# Patient Record
Sex: FEMALE | Race: WHITE | HISPANIC OR LATINO | Employment: FULL TIME | URBAN - NONMETROPOLITAN AREA
[De-identification: names, ages, dates, MRNs, and addresses within clinical notes are randomized per-mention and may not be internally consistent; named-entity substitution may affect disease eponyms.]

---

## 2017-01-05 ENCOUNTER — APPOINTMENT (OUTPATIENT)
Dept: OCCUPATIONAL MEDICINE | Facility: OTHER | Age: 32
End: 2017-01-05

## 2017-01-13 ENCOUNTER — APPOINTMENT (OUTPATIENT)
Dept: OCCUPATIONAL MEDICINE | Facility: OTHER | Age: 32
End: 2017-01-13

## 2019-02-21 ENCOUNTER — HOSPITAL ENCOUNTER (EMERGENCY)
Facility: HOSPITAL | Age: 34
Discharge: LEFT BEFORE DISCHARGE | End: 2019-02-21
Admitting: FAMILY MEDICINE
Payer: COMMERCIAL

## 2019-02-21 VITALS
TEMPERATURE: 98.9 F | SYSTOLIC BLOOD PRESSURE: 144 MMHG | RESPIRATION RATE: 14 BRPM | OXYGEN SATURATION: 99 % | DIASTOLIC BLOOD PRESSURE: 91 MMHG

## 2019-02-21 PROCEDURE — 40000268 ZZH STATISTIC NO CHARGES

## 2019-02-21 NOTE — ED AVS SNAPSHOT
[facility]  [address]  [place] [address]  Phone:  [phone]                                    Opal Henderson   MRN: [id number]    Department:  [facility]   Date of Visit:  2/21/2019           After Visit Summary Signature Page    I have received my discharge instructions, and my questions have been answered. I have discussed any challenges I see with this plan with the nurse or doctor.    ..........................................................................................................................................  Patient/Patient Representative Signature      ..........................................................................................................................................  Patient Representative Print Name and Relationship to Patient    ..................................................               ................................................  Date                                   Time    ..........................................................................................................................................  Reviewed by Signature/Title    ...................................................              ..............................................  Date                                               Time          22EPIC Rev 08/18

## 2019-02-22 ENCOUNTER — HOSPITAL ENCOUNTER (EMERGENCY)
Facility: HOSPITAL | Age: 34
Discharge: HOME | End: 2019-02-22
Attending: NURSE PRACTITIONER | Admitting: NURSE PRACTITIONER
Payer: COMMERCIAL

## 2019-02-22 VITALS
TEMPERATURE: 98.1 F | HEIGHT: 59 IN | RESPIRATION RATE: 18 BRPM | SYSTOLIC BLOOD PRESSURE: 146 MMHG | DIASTOLIC BLOOD PRESSURE: 102 MMHG | BODY MASS INDEX: 32.05 KG/M2 | WEIGHT: 159 LBS | HEART RATE: 65 BPM | OXYGEN SATURATION: 99 %

## 2019-02-22 DIAGNOSIS — H66.92 LEFT OTITIS MEDIA, UNSPECIFIED OTITIS MEDIA TYPE: ICD-10-CM

## 2019-02-22 PROCEDURE — 99203 OFFICE O/P NEW LOW 30 MIN: CPT | Mod: Z6 | Performed by: NURSE PRACTITIONER

## 2019-02-22 PROCEDURE — G0463 HOSPITAL OUTPT CLINIC VISIT: HCPCS

## 2019-02-22 RX ORDER — CETIRIZINE HYDROCHLORIDE 10 MG/1
10 TABLET ORAL AT BEDTIME
Qty: 30 TABLET | Refills: 0 | Status: SHIPPED | OUTPATIENT
Start: 2019-02-22 | End: 2019-12-28

## 2019-02-22 RX ORDER — AMOXICILLIN 500 MG/1
1000 CAPSULE ORAL 2 TIMES DAILY
Qty: 40 CAPSULE | Refills: 0 | Status: SHIPPED | OUTPATIENT
Start: 2019-02-22 | End: 2019-03-04

## 2019-02-22 RX ORDER — FLUTICASONE PROPIONATE 50 MCG
2 SPRAY, SUSPENSION (ML) NASAL DAILY
Qty: 1 BOTTLE | Refills: 0 | Status: SHIPPED | OUTPATIENT
Start: 2019-02-22 | End: 2019-12-28

## 2019-02-22 ASSESSMENT — ENCOUNTER SYMPTOMS
COUGH: 1
APPETITE CHANGE: 0
SORE THROAT: 1
FEVER: 0

## 2019-02-22 ASSESSMENT — MIFFLIN-ST. JEOR: SCORE: 1336.85

## 2019-02-22 NOTE — ED AVS SNAPSHOT
[facility]  [address]  [place] [address]  Phone:  [phone]                                    Opal Henderson   MRN: [id number]    Department:  [facility]   Date of Visit:  2/22/2019           After Visit Summary Signature Page    I have received my discharge instructions, and my questions have been answered. I have discussed any challenges I see with this plan with the nurse or doctor.    ..........................................................................................................................................  Patient/Patient Representative Signature      ..........................................................................................................................................  Patient Representative Print Name and Relationship to Patient    ..................................................               ................................................  Date                                   Time    ..........................................................................................................................................  Reviewed by Signature/Title    ...................................................              ..............................................  Date                                               Time          22EPIC Rev 08/18

## 2019-02-22 NOTE — ED PROVIDER NOTES
History     Chief Complaint   Patient presents with     Otalgia     Bilateral X 2-3 days     HPI  Opal Henderson is a 33 year old female who presents today alone with a CC of bilateral ear pain and pressure x 2 days.  No fevers.  Associated symptoms include:  Cough, nasal congestion, mild sore throat, scratchy throat.  She has a history of OM as an adult.  She had PE tubes as a child.  No other otological surgery.  No emile otorrhea.  She has a history of spontaneous perforation from OM in the past.      Allergies:  No Known Allergies    Problem List:    Patient Active Problem List    Diagnosis Date Noted     Surgery, elective 2016     Priority: Medium      (spontaneous vaginal delivery) 2016     Priority: Medium     VTX/VTX Twins   delivery Ambrose 16  Intact  Boy [place]  Girl Flor  Desires BTO       Oligohydramnios 2016     Priority: Medium     GBS (group B Streptococcus carrier), +RV culture, currently pregnant 2016     Priority: Medium     ACP (advance care planning) 2016     Priority: Medium     Advance Care Planning 2016: ACP Review of Chart / Resources Provided:  Reviewed chart for advance care plan.  Opal Henderson has been provided information and resources to begin or update their advance care plan.  Added by Kadie Mcdaniel             Abnormal maternal glucose tolerance, antepartum 2016     Priority: Medium     GDM-Diabetes center referral       Encounter for sterilization 05/10/2016     Priority: Medium     Desires pp BTO.  Counseling and federal sterilization consent forms done.         Dichorionic diamniotic twin gestation 2016     Priority: Medium     Di-Di on early US  Suprise  Serial US growth  Nml Quad  Transfer of care from [facility] Dr. Barrios  Nml level 2 US  BC-Desires BTO  Peds:  Dr. Barrios  RHIG and TDAP done 28 weeks          Past Medical History:    Past Medical History:   Diagnosis Date     HGSIL on Pap smear of cervix  "05/10/2005     Pap smear of cervix with ASCUS, cannot exclude HGSIL 10/16/2012     Reflux esophagitis 4/7/2009       Past Surgical History:    Past Surgical History:   Procedure Laterality Date     HERNIORRHAPHY UMBILICAL N/A 8/19/2016    Procedure: HERNIORRHAPHY UMBILICAL;  Surgeon: Mirza Baker MD;  [facility]     LAPAROTOMY MINI, TUBAL LIGATION (POST PARTUM), COMBINED Bilateral 8/19/2016    Procedure: COMBINED LAPAROTOMY MINI, TUBAL LIGATION (POST PARTUM);  Surgeon: Mirza Baker MD;  Location: [facility]     PE TUBES  pt 10 years old       Family History:    Family History   Problem Relation Age of Onset     Asthma Sister      Asthma Brother      Heart Disease Maternal Grandmother      Hearing Loss Paternal Grandfather      Heart Disease Paternal Grandfather        Social History:  Marital Status:  Single [1]  Social History     Tobacco Use     Smoking status: Never Smoker     Smokeless tobacco: Never Used   Substance Use Topics     Alcohol use: No     Alcohol/week: 0.0 oz     Drug use: No        Medications:      amoxicillin (AMOXIL) 500 MG capsule   cetirizine (ZYRTEC) 10 MG tablet   fluticasone (FLONASE) 50 MCG/ACT nasal spray         Review of Systems   Constitutional: Negative for appetite change and fever.   HENT: Positive for congestion, hearing loss (decreased over last couple of days left > right) and sore throat.    Respiratory: Positive for cough.        Physical Exam   BP: (!) 146/102  Pulse: 65  Temp: 98.1  F (36.7  C)  Resp: 18  Height: 149.9 cm (4' 11\")  Weight: 72.1 kg (159 lb)  SpO2: 99 %      Physical Exam   Constitutional: She appears well-developed. She is cooperative. She does not appear ill.   HENT:   Head: Normocephalic and atraumatic.   Left Ear: Tympanic membrane is scarred, erythematous and retracted. Tympanic membrane is not perforated.   Mouth/Throat: Uvula is midline and oropharynx is clear and moist.   Right Cerumen occluded, unable to view TM   Cardiovascular: Normal rate and " regular rhythm.   Pulmonary/Chest: Effort normal and breath sounds normal.   Musculoskeletal: Normal range of motion.   Neurological: She is alert.   Skin: Skin is warm and dry.   Psychiatric: She has a normal mood and affect. Her behavior is normal.   Nursing note and vitals reviewed.      ED Course        Procedures    Assessments & Plan (with Medical Decision Making)     I have reviewed the nursing notes.    I have reviewed the findings, diagnosis, plan and need for follow up with the patient.  ASSESSMENT / PLAN:  (H66.92) Left otitis media, unspecified otitis media type  Comment: with right otalgia, unable to view right TM due to cerumen occlusion  Plan:  Amoxicillin as prescribed   Zyrtec as prescribed   Flonase as prescribed   Return to ED/UC as needed if symptoms worsen or new concerns develop    Follow up with PCP if symptoms do not improve in 3-4 days of treatment   Patient verbally educated and given appropriate education sheets for each of their diagnoses and has no questions.   Take OTC motrin or tylenol as directed on the bottle as needed.   Increase fluids, rest, wash hands often.           Medication List      Started    amoxicillin 500 MG capsule  Commonly known as:  AMOXIL  1,000 mg, Oral, 2 TIMES DAILY     cetirizine 10 MG tablet  Commonly known as:  zyrTEC  10 mg, Oral, AT BEDTIME     fluticasone 50 MCG/ACT nasal spray  Commonly known as:  FLONASE  2 sprays, Both Nostrils, DAILY            Final diagnoses:   Left otitis media, unspecified otitis media type       2/22/2019   [facility]     Susanna Carpenter NP  02/23/19 0402

## 2019-04-24 ENCOUNTER — TRANSFERRED RECORDS (OUTPATIENT)
Dept: HEALTH INFORMATION MANAGEMENT | Facility: HOSPITAL | Age: 34
End: 2019-04-24

## 2019-04-24 LAB
HPV ABSTRACT: NORMAL
PAP-ABSTRACT: NORMAL

## 2019-12-28 ENCOUNTER — HOSPITAL ENCOUNTER (EMERGENCY)
Facility: HOSPITAL | Age: 34
Discharge: HOME | End: 2019-12-28
Attending: NURSE PRACTITIONER | Admitting: NURSE PRACTITIONER
Payer: COMMERCIAL

## 2019-12-28 VITALS
WEIGHT: 145 LBS | BODY MASS INDEX: 29.29 KG/M2 | TEMPERATURE: 97.6 F | RESPIRATION RATE: 16 BRPM | DIASTOLIC BLOOD PRESSURE: 92 MMHG | SYSTOLIC BLOOD PRESSURE: 142 MMHG | OXYGEN SATURATION: 99 %

## 2019-12-28 DIAGNOSIS — J01.00 SUBACUTE MAXILLARY SINUSITIS: ICD-10-CM

## 2019-12-28 PROCEDURE — 99213 OFFICE O/P EST LOW 20 MIN: CPT | Mod: Z6 | Performed by: NURSE PRACTITIONER

## 2019-12-28 PROCEDURE — G0463 HOSPITAL OUTPT CLINIC VISIT: HCPCS

## 2019-12-28 RX ORDER — AZITHROMYCIN 250 MG/1
TABLET, FILM COATED ORAL
Qty: 6 TABLET | Refills: 0 | Status: SHIPPED | OUTPATIENT
Start: 2019-12-28 | End: 2020-01-02

## 2019-12-28 ASSESSMENT — ENCOUNTER SYMPTOMS
MUSCULOSKELETAL NEGATIVE: 1
SORE THROAT: 1
PSYCHIATRIC NEGATIVE: 1
CARDIOVASCULAR NEGATIVE: 1
VOMITING: 0
DIARRHEA: 0
ABDOMINAL PAIN: 0
RHINORRHEA: 1
HEADACHES: 0
COUGH: 1
NAUSEA: 0
SINUS PRESSURE: 1
DIFFICULTY URINATING: 0
DIZZINESS: 0
EYES NEGATIVE: 1

## 2019-12-28 NOTE — ED AVS SNAPSHOT
[facility]  [address]  [place] [address]  Phone:  [phone]                                    Opal Henderson   MRN: [id number]    Department:  [facility]   Date of Visit:  12/28/2019           After Visit Summary Signature Page    I have received my discharge instructions, and my questions have been answered. I have discussed any challenges I see with this plan with the nurse or doctor.    ..........................................................................................................................................  Patient/Patient Representative Signature      ..........................................................................................................................................  Patient Representative Print Name and Relationship to Patient    ..................................................               ................................................  Date                                   Time    ..........................................................................................................................................  Reviewed by Signature/Title    ...................................................              ..............................................  Date                                               Time          22EPIC Rev 08/18

## 2019-12-28 NOTE — ED PROVIDER NOTES
History     Chief Complaint   Patient presents with     Sinusitis     HPI  Opal Henderson is a 34 year old female who has right sided sinus pressure  , postnasal drip, Right ear pain for 4 days. Has sore throat.+ cough , worse at night.   No fevers, No Nausea, vomiting , diarrhea   States  Hx of sinus infections that get bad fast.      Allergies:  No Known Allergies    Problem List:    Patient Active Problem List    Diagnosis Date Noted     Surgery, elective 2016     Priority: Medium      (spontaneous vaginal delivery) 2016     Priority: Medium     VTX/VTX Twins   delivery [place] 16  Intact  Boy [place]  Girl Flor  Desires BTO       Oligohydramnios 2016     Priority: Medium     GBS (group B Streptococcus carrier), +RV culture, currently pregnant 2016     Priority: Medium     ACP (advance care planning) 2016     Priority: Medium     Advance Care Planning 2016: ACP Review of Chart / Resources Provided:  Reviewed chart for advance care plan.  Opal Henderson has been provided information and resources to begin or update their advance care plan.  Added by Kadie Mcdaniel             Abnormal maternal glucose tolerance, antepartum 2016     Priority: Medium     GDM-Diabetes center referral       Encounter for sterilization 05/10/2016     Priority: Medium     Desires pp BTO.  Counseling and federal sterilization consent forms done.         Dichorionic diamniotic twin gestation 2016     Priority: Medium     Di-Di on early US  Suprise  Serial US growth  Nml Quad  Transfer of care from [facility] Dr. Barrios  Nml level 2 US  BC-Desires BTO  Peds:  Dr. Barrios  RHIG and TDAP done 28 weeks          Past Medical History:    Past Medical History:   Diagnosis Date     HGSIL on Pap smear of cervix 05/10/2005     Pap smear of cervix with ASCUS, cannot exclude HGSIL 10/16/2012     Reflux esophagitis 2009       Past Surgical History:    Past Surgical History:    Procedure Laterality Date     HERNIORRHAPHY UMBILICAL N/A 8/19/2016    Procedure: HERNIORRHAPHY UMBILICAL;  Surgeon: Mirza Baker MD;  [facility]     LAPAROTOMY MINI, TUBAL LIGATION (POST PARTUM), COMBINED Bilateral 8/19/2016    Procedure: COMBINED LAPAROTOMY MINI, TUBAL LIGATION (POST PARTUM);  Surgeon: Mirza Baker MD;  Location: [facility]     PE TUBES  pt 9 years old       Family History:    Family History   Problem Relation Age of Onset     Asthma Sister      Asthma Brother      Heart Disease Maternal Grandmother      Hearing Loss Paternal Grandfather      Heart Disease Paternal Grandfather        Social History:  Marital Status:  Single [1]  Social History     Tobacco Use     Smoking status: Never Smoker     Smokeless tobacco: Never Used   Substance Use Topics     Alcohol use: No     Alcohol/week: 0.0 standard drinks     Drug use: No        Medications:    azithromycin (ZITHROMAX Z-ADOLFO) 250 MG tablet          Review of Systems   HENT: Positive for ear pain, postnasal drip, rhinorrhea, sinus pressure and sore throat.    Eyes: Negative.    Respiratory: Positive for cough.    Cardiovascular: Negative.    Gastrointestinal: Negative for abdominal pain, diarrhea, nausea and vomiting.   Genitourinary: Negative for difficulty urinating.   Musculoskeletal: Negative.    Neurological: Negative for dizziness and headaches.   Psychiatric/Behavioral: Negative.        Physical Exam   BP: 142/92  Heart Rate: 81  Temp: 97.6  F (36.4  C)  Resp: 16  Weight: 65.8 kg (145 lb)  SpO2: 99 %      Physical Exam  Constitutional:       Appearance: Normal appearance.   HENT:      Right Ear: Ear canal and external ear normal.      Left Ear: Ear canal and external ear normal.      Ears:      Comments: TM's are slightly contracted, opaque.       Nose: Nose normal.      Comments: Nares red swollen.       Mouth/Throat:      Mouth: Mucous membranes are moist.      Pharynx: Oropharynx is clear.   Eyes:      Extraocular Movements:  Extraocular movements intact.      Conjunctiva/sclera: Conjunctivae normal.      Pupils: Pupils are equal, round, and reactive to light.   Neck:      Musculoskeletal: Normal range of motion and neck supple. No muscular tenderness.   Cardiovascular:      Rate and Rhythm: Normal rate and regular rhythm.      Heart sounds: Normal heart sounds. No murmur.   Pulmonary:      Effort: Pulmonary effort is normal.      Breath sounds: Normal breath sounds.   Musculoskeletal: Normal range of motion.         General: No swelling.   Lymphadenopathy:      Cervical: No cervical adenopathy.   Skin:     General: Skin is warm and dry.   Neurological:      General: No focal deficit present.      Mental Status: She is alert and oriented to person, place, and time.   Psychiatric:         Mood and Affect: Mood normal.         ED Course        Procedures            No results found for this or any previous visit (from the past 24 hour(s)).    Medications - No data to display    Assessments & Plan (with Medical Decision Making)     I have reviewed the nursing notes.    I have reviewed the findings, diagnosis, plan and need for follow up with the patient.      New Prescriptions    AZITHROMYCIN (ZITHROMAX Z-ADOLFO) 250 MG TABLET    Two tablets on the first day, then one tablet daily for the next 4 days       Final diagnoses:   Subacute maxillary sinusitis     ASSESSMENT / PLAN:  (J01.00) Subacute maxillary sinusitis  Comment:   Plan: 1. Zpak  500mg today, 250mg a day for 4 days  2. Drink plenty luids  3.  Flonase 1 spray to each nostril 2 times a day for 2-3 days        12/28/2019   [facility]     Damien Dowling NP  12/28/19 1007

## 2019-12-28 NOTE — ED TRIAGE NOTES
Pt presents with right sided sinus pain and pressure for 3 days, cough and sore throat. Has been using saline nasal spray and generic Sudafed.

## 2019-12-28 NOTE — DISCHARGE INSTRUCTIONS
Zpak 500mg today, 250mg a day for 4 days  Keep well hydrated   Flonase 1 spray 2 times a day to each nostril for 2-3 days.

## 2020-11-16 ENCOUNTER — APPOINTMENT (OUTPATIENT)
Dept: OCCUPATIONAL MEDICINE | Facility: OTHER | Age: 35
End: 2020-11-16

## 2021-02-12 PROBLEM — L92.0 GRANULOMA ANNULARE: Status: ACTIVE | Noted: 2017-09-28

## 2021-02-12 PROBLEM — E66.9 NON MORBID OBESITY, UNSPECIFIED OBESITY TYPE: Status: ACTIVE | Noted: 2017-09-28

## 2021-10-06 ENCOUNTER — APPOINTMENT (OUTPATIENT)
Dept: OCCUPATIONAL MEDICINE | Facility: OTHER | Age: 36
End: 2021-10-06

## 2021-10-19 ENCOUNTER — LAB (OUTPATIENT)
Dept: OCCUPATIONAL MEDICINE | Facility: OTHER | Age: 36
End: 2021-10-19

## 2021-10-19 ENCOUNTER — HOSPITAL ENCOUNTER (EMERGENCY)
Facility: HOSPITAL | Age: 36
Discharge: HOME | End: 2021-10-19
Attending: NURSE PRACTITIONER | Admitting: NURSE PRACTITIONER
Payer: COMMERCIAL

## 2021-10-19 VITALS
HEART RATE: 82 BPM | TEMPERATURE: 98.4 F | DIASTOLIC BLOOD PRESSURE: 93 MMHG | OXYGEN SATURATION: 99 % | SYSTOLIC BLOOD PRESSURE: 131 MMHG | RESPIRATION RATE: 18 BRPM

## 2021-10-19 DIAGNOSIS — H66.001 ACUTE SUPPURATIVE OTITIS MEDIA OF RIGHT EAR WITHOUT SPONTANEOUS RUPTURE OF TYMPANIC MEMBRANE: Primary | ICD-10-CM

## 2021-10-19 DIAGNOSIS — H66.001 ACUTE SUPPURATIVE OTITIS MEDIA OF RIGHT EAR WITHOUT SPONTANEOUS RUPTURE OF TYMPANIC MEMBRANE, RECURRENCE NOT SPECIFIED: ICD-10-CM

## 2021-10-19 PROCEDURE — G0463 HOSPITAL OUTPT CLINIC VISIT: HCPCS

## 2021-10-19 PROCEDURE — 99213 OFFICE O/P EST LOW 20 MIN: CPT | Performed by: NURSE PRACTITIONER

## 2021-10-19 ASSESSMENT — ENCOUNTER SYMPTOMS
FEVER: 1
CHILLS: 0

## 2021-10-19 NOTE — DISCHARGE INSTRUCTIONS
Take antibiotic as prescribed until finished.     Take tylenol or ibuprofen as needed for pain or fever.    Follow up with your doctor if no improvement in symptoms.    Return to emergency department for worsening or concerning symptoms.

## 2021-10-19 NOTE — ED PROVIDER NOTES
History     Chief Complaint   Patient presents with     Otalgia     Tested positive for COVID-19 this am. c/o right ear pain .      HPI  Opal Henderson is a 36 year old female who presents to urgent care for concerns of right ear pain that started today.  No ear drainage.  Patient also notes she has been having fevers.  Reports mild URI symptoms and tested positive for COVID-19 this morning.  History of frequent ear infections.  No significant changes to her hearing.  Patient has no other concerns at this time.    Allergies:  No Known Allergies    Problem List:    Patient Active Problem List    Diagnosis Date Noted     Granuloma annulare 2017     Priority: Medium     Non morbid obesity, unspecified obesity type 2017     Priority: Medium     Surgery, elective 2016     Priority: Medium      (spontaneous vaginal delivery) 2016     Priority: Medium     VTX/VTX Twins   delivery [place] 16  Intact  Boy [place]  Girl [place]  Desires BTO       Oligohydramnios 2016     Priority: Medium     GBS (group B Streptococcus carrier), +RV culture, currently pregnant 2016     Priority: Medium     ACP (advance care planning) 2016     Priority: Medium     Advance Care Planning 2016: ACP Review of Chart / Resources Provided:  Reviewed chart for advance care plan.  Opal Henderson has been provided information and resources to begin or update their advance care plan.  Added by Kadie Mcdaniel             Abnormal maternal glucose tolerance, antepartum 2016     Priority: Medium     GDM-Diabetes center referral       Encounter for sterilization 05/10/2016     Priority: Medium     Desires pp BTO.  Counseling and federal sterilization consent forms done.         Dichorionic diamniotic twin gestation 2016     Priority: Medium     Di-Di on early US  Suprise  Serial US growth  Nml Quad  Transfer of care from [facility] Dr. Barrios  Nml level 2 US  BC-Desires BTO  Peds:    [facility]  RHIG and TDAP done 28 weeks       Needle stick injury 02/07/2013     Priority: Medium     Overview:   [facility], primary blood negative per lab testing, 35wk exposure       Dysplasia of cervix, low grade (AMEE 1) 10/23/2012     Priority: Medium        Past Medical History:    Past Medical History:   Diagnosis Date     HGSIL on Pap smear of cervix 05/10/2005     Pap smear of cervix with ASCUS, cannot exclude HGSIL 10/16/2012     Reflux esophagitis 4/7/2009       Past Surgical History:    Past Surgical History:   Procedure Laterality Date     HERNIORRHAPHY UMBILICAL N/A 8/19/2016    Procedure: HERNIORRHAPHY UMBILICAL;  Surgeon: Mirza Baker MD;  [facility]: [facility]     LAPAROTOMY MINI, TUBAL LIGATION (POST PARTUM), COMBINED Bilateral 8/19/2016    Procedure: COMBINED LAPAROTOMY MINI, TUBAL LIGATION (POST PARTUM);  Surgeon: Mirza Baker MD;  Location: [facility]     PE TUBES  pt 9 years old       Family History:    Family History   Problem Relation Age of Onset     Asthma Sister      Asthma Brother      Heart Disease Maternal Grandmother      Hearing Loss Paternal Grandfather      Heart Disease Paternal Grandfather        Social History:  Marital Status:  Single [1]  Social History     Tobacco Use     Smoking status: Never Smoker     Smokeless tobacco: Never Used   Substance Use Topics     Alcohol use: No     Alcohol/week: 0.0 standard drinks     Drug use: No        Medications:    amoxicillin-clavulanate (AUGMENTIN) 875-125 MG tablet          Review of Systems   Constitutional: Positive for fever. Negative for chills.   HENT: Positive for ear pain. Negative for ear discharge.    All other systems reviewed and are negative.      Physical Exam   BP: 131/93  Pulse: 82  Temp: 98.4  F (36.9  C)  Resp: 18  SpO2: 99 %      Physical Exam  Vitals and nursing note reviewed.   Constitutional:       Appearance: Normal appearance. She is not ill-appearing or toxic-appearing.   HENT:      Head: Normocephalic and atraumatic.       Jaw: No trismus.      Right Ear: No drainage or swelling. There is no impacted cerumen. Tympanic membrane is erythematous and bulging.      Left Ear: Tympanic membrane and ear canal normal. There is no impacted cerumen.      Ears:      Comments: Redness to right ear canal along with with erythema to a part of the right TM.  Rest of the a TM is opaque and bulging.  No drainage to right ear canal.     Nose: Nose normal.      Mouth/Throat:      Mouth: Mucous membranes are moist.   Eyes:      Pupils: Pupils are equal, round, and reactive to light.   Cardiovascular:      Rate and Rhythm: Normal rate and regular rhythm.      Heart sounds: Normal heart sounds.   Pulmonary:      Effort: Pulmonary effort is normal. No respiratory distress.      Breath sounds: No wheezing or rhonchi.   Musculoskeletal:         General: Normal range of motion.      Cervical back: Neck supple.   Skin:     General: Skin is warm and dry.   Neurological:      Mental Status: She is alert and oriented to person, place, and time.         ED Course        Procedures            No results found for this or any previous visit (from the past 24 hour(s)).    Medications - No data to display    Assessments & Plan (with Medical Decision Making)     I have reviewed the nursing notes.    36-year-old female that was positive for COVID-19 as of today and presented today for evaluation of right ear pain that started today.  Respirations are nonlabored.  Vital signs are within normal limits.  Patient has acute suppurative otitis media of the right ear which will be treated with Augmentin.  Recommended APAP/ibuprofen as needed for pain or fever.  Follow-up with PCP as needed.  Return to ED/UC for worsening or concerning symptoms.  Patient verbalized understanding.    I have reviewed the findings, diagnosis, plan and need for follow up with the patient.  This document was prepared using a combination of typing and voice generated software.  While every attempt was  made for accuracy, spelling and grammatical errors may exist.    New Prescriptions    AMOXICILLIN-CLAVULANATE (AUGMENTIN) 875-125 MG TABLET    Take 1 tablet by mouth 2 times daily for 7 days       Final diagnoses:   Acute suppurative otitis media of right ear without spontaneous rupture of tympanic membrane       10/19/2021   [facility]     Mpofu, Catherineudence, CNP  10/19/21 [id number]

## 2021-11-27 ENCOUNTER — HEALTH MAINTENANCE LETTER (OUTPATIENT)
Age: 36
End: 2021-11-27

## 2022-09-04 ENCOUNTER — HEALTH MAINTENANCE LETTER (OUTPATIENT)
Age: 37
End: 2022-09-04

## 2023-01-15 ENCOUNTER — HEALTH MAINTENANCE LETTER (OUTPATIENT)
Age: 38
End: 2023-01-15

## 2023-03-20 ENCOUNTER — TELEPHONE (OUTPATIENT)
Dept: NURSING | Facility: CLINIC | Age: 38
End: 2023-03-20

## 2023-03-20 ENCOUNTER — HOSPITAL ENCOUNTER (EMERGENCY)
Facility: HOSPITAL | Age: 38
Discharge: HOME | End: 2023-03-20
Payer: COMMERCIAL

## 2023-03-20 VITALS
TEMPERATURE: 98.4 F | DIASTOLIC BLOOD PRESSURE: 88 MMHG | OXYGEN SATURATION: 96 % | SYSTOLIC BLOOD PRESSURE: 124 MMHG | HEART RATE: 99 BPM | RESPIRATION RATE: 16 BRPM

## 2023-03-20 DIAGNOSIS — H66.90 OTITIS MEDIA: ICD-10-CM

## 2023-03-20 LAB
FLUAV RNA SPEC QL NAA+PROBE: NEGATIVE
FLUBV RNA RESP QL NAA+PROBE: NEGATIVE
RSV RNA SPEC NAA+PROBE: NEGATIVE
SARS-COV-2 RNA RESP QL NAA+PROBE: POSITIVE

## 2023-03-20 PROCEDURE — C9803 HOPD COVID-19 SPEC COLLECT: HCPCS

## 2023-03-20 PROCEDURE — G0463 HOSPITAL OUTPT CLINIC VISIT: HCPCS

## 2023-03-20 PROCEDURE — 87637 SARSCOV2&INF A&B&RSV AMP PRB: CPT

## 2023-03-20 PROCEDURE — 99213 OFFICE O/P EST LOW 20 MIN: CPT | Mod: CS

## 2023-03-20 ASSESSMENT — ENCOUNTER SYMPTOMS
VOMITING: 0
EYE PAIN: 0
ACTIVITY CHANGE: 0
CHILLS: 0
ABDOMINAL PAIN: 0
DIARRHEA: 0
SHORTNESS OF BREATH: 0
FEVER: 1
COUGH: 0
WHEEZING: 0
NAUSEA: 0
CHEST TIGHTNESS: 0
RHINORRHEA: 1
APPETITE CHANGE: 0
SORE THROAT: 1

## 2023-03-20 NOTE — ED TRIAGE NOTES
Cough, bilateral ear pain and t-max temp 99 F x2 days     Triage Assessment     Row Name 03/20/23 1328       Triage Assessment (Adult)    Airway WDL WDL

## 2023-03-20 NOTE — DISCHARGE INSTRUCTIONS
We will call you with your results.     You can also try taking a Sudafed to help with the congestion.     Tylenol and ibuprofen as needed for pain and fevers.     Complete all abx. I would add a yogurt or probiotic with the abx.

## 2023-03-20 NOTE — TELEPHONE ENCOUNTER
Patient classified as COVID treatment eligible by Epic high risk algorithm:  No    Coronavirus (COVID-19) Notification    Reason for call  Notify of POSITIVE COVID-19 lab result, assess symptoms,  review [facility] recommendations    Lab Result   Lab test for 2019-nCoV rRt-PCR or SARS-COV-2 PCR  Oropharyngeal AND/OR nasopharyngeal swabs were POSITIVE for 2019-nCoV RNA [OR] SARS-COV-2 RNA (COVID-19) RNA     We have been unable to reach patient by phone at this time to notify of their Positive COVID-19 result.    Left voicemail message requesting a call back to [phone] [facility] for results.        A Positive COVID-19 letter will be sent via [email or web address] or the mail.    Sangeetha Mcleod

## 2023-03-20 NOTE — ED PROVIDER NOTES
History     Chief Complaint   Patient presents with     Otalgia     HPI  Opal Henderson is a 37 year old female who presents to the urgent care with a two day history of bilateral ear pain, congestion, scratchy throat, and low grade temps (Tmax 99). She denies n/v/d, cough, SOB, and decreased appetite. She has not taken any OTC med. Non smoker. No recent abx.         Allergies:  No Known Allergies    Problem List:    Patient Active Problem List    Diagnosis Date Noted     Granuloma annulare 2017     Priority: Medium     Non morbid obesity, unspecified obesity type 2017     Priority: Medium     Surgery, elective 2016     Priority: Medium      (spontaneous vaginal delivery) 2016     Priority: Medium     VTX/VTX Twins   delivery [place] 16  Intact  Boy [place]  Girl [place]  Desires BTO       Oligohydramnios 2016     Priority: Medium     GBS (group B Streptococcus carrier), +RV culture, currently pregnant 2016     Priority: Medium     ACP (advance care planning) 2016     Priority: Medium     Advance Care Planning 2016: ACP Review of Chart / Resources Provided:  Reviewed chart for advance care plan.  Opal Henderson has been provided information and resources to begin or update their advance care plan.  Added by Kadie Mcdaniel             Abnormal maternal glucose tolerance, antepartum 2016     Priority: Medium     GDM-Diabetes center referral       Encounter for sterilization 05/10/2016     Priority: Medium     Desires pp BTO.  Counseling and federal sterilization consent forms done.         Dichorionic diamniotic twin gestation 2016     Priority: Medium     Di-Di on early US  Suprise  Serial US growth  Nml Quad  Transfer of care from [facility] Dr. Barrios  Nml level 2 US  BC-Desires BTO  Peds:  Dr. Barrios  RHIG and TDAP done 28 weeks       Needle stick injury 2013     Priority: Medium     Overview:   [facility], primary blood negative per  lab testing, 35wk exposure       Dysplasia of cervix, low grade (AMEE 1) 10/23/2012     Priority: Medium        Past Medical History:    Past Medical History:   Diagnosis Date     HGSIL on Pap smear of cervix 05/10/2005     Pap smear of cervix with ASCUS, cannot exclude HGSIL 10/16/2012     Reflux esophagitis 4/7/2009       Past Surgical History:    Past Surgical History:   Procedure Laterality Date     HERNIORRHAPHY UMBILICAL N/A 8/19/2016    Procedure: HERNIORRHAPHY UMBILICAL;  Surgeon: Mirza Baker MD;  Location: [facility] OR     LAPAROTOMY MINI, TUBAL LIGATION (POST PARTUM), COMBINED Bilateral 8/19/2016    Procedure: COMBINED LAPAROTOMY MINI, TUBAL LIGATION (POST PARTUM);  Surgeon: Mirza Baker MD;  Location: [facility] OR     PE TUBES  pt 10 years old       Family History:    Family History   Problem Relation Age of Onset     Asthma Sister      Asthma Brother      Heart Disease Maternal Grandmother      Hearing Loss Paternal Grandfather      Heart Disease Paternal Grandfather        Social History:  Marital Status:  Single [1]  Social History     Tobacco Use     Smoking status: Never     Smokeless tobacco: Never   Substance Use Topics     Alcohol use: No     Alcohol/week: 0.0 standard drinks     Drug use: No        Medications:    amoxicillin-clavulanate (AUGMENTIN) 875-125 MG tablet          Review of Systems   Constitutional: Positive for fever. Negative for activity change, appetite change and chills.   HENT: Positive for congestion, ear pain (bilateral ), rhinorrhea and sore throat (scratchy ).    Eyes: Negative for pain.   Respiratory: Negative for cough, chest tightness, shortness of breath and wheezing.    Gastrointestinal: Negative for abdominal pain, diarrhea, nausea and vomiting.   All other systems reviewed and are negative.      Physical Exam   BP: 124/88  Pulse: 99  Temp: 98.4  F (36.9  C)  Resp: 16  SpO2: 96 %      Physical Exam  Vitals and nursing note reviewed.   Constitutional:       General: She is not in  acute distress.     Appearance: Normal appearance. She is ill-appearing.   HENT:      Right Ear: Hearing, ear canal and external ear normal. There is no impacted cerumen. Tympanic membrane is erythematous and bulging.      Left Ear: Hearing, ear canal and external ear normal. There is no impacted cerumen. Tympanic membrane is erythematous and bulging.      Nose: Congestion present.      Right Sinus: No maxillary sinus tenderness or frontal sinus tenderness.      Left Sinus: No maxillary sinus tenderness or frontal sinus tenderness.      Mouth/Throat:      Pharynx: Oropharynx is clear. Posterior oropharyngeal erythema present. No oropharyngeal exudate.   Cardiovascular:      Rate and Rhythm: Normal rate and regular rhythm.      Pulses: Normal pulses.      Heart sounds: Normal heart sounds. No murmur heard.  Pulmonary:      Effort: Pulmonary effort is normal. No respiratory distress.      Breath sounds: Normal breath sounds. No stridor. No wheezing or rhonchi.   Lymphadenopathy:      Cervical: No cervical adenopathy.   Skin:     General: Skin is warm and dry.   Neurological:      Mental Status: She is alert.         ED Course                 Procedures           No results found for this or any previous visit (from the past 24 hour(s)).    Medications - No data to display    Assessments & Plan (with Medical Decision Making)     I have reviewed the nursing notes.    I have reviewed the findings, diagnosis, plan and need for follow up with the patient.    Opal Henderson is a 37 year old female who presents to the urgent care with a two day history of bilateral ear pain, congestion, scratchy throat, and low grade temps (Tmax 99). She denies n/v/d, cough, SOB, and decreased appetite. She has not taken any OTC med. Non smoker. No recent abx.     (H66.90) Otitis media  Plan: augmentin prescribed. Encouraged sudafed to help with congestion. Tylenol and ibuprofen as needed for pain. Complete all abx. Yogurt or probiotic  while taking abx. Return with any concerns. Understanding verbalized.     MDM: Covid multiplex pending.   VSS and afebrile. Lungs clear and heart tones regular. There is no sinus tenderness. Erythema noted bilaterally to TM. No mastoid tenderness. Discussed watchful waiting. Opal would like an abx today. Augmentin prescribed.       New Prescriptions    AMOXICILLIN-CLAVULANATE (AUGMENTIN) 875-125 MG TABLET    Take 1 tablet by mouth 2 times daily for 7 days       Final diagnoses:   Otitis media       3/20/2023   [facility]     Haritha Ko, NP  03/20/23 1410     none

## 2023-03-20 NOTE — ED TRIAGE NOTES
Pt presents with c/o yvonne ear pain  Cough, low grade fever, bilateral ear pain, nasal congestion, sinus pressure,   Denies any drainage, nvd, or headaches,   Sx started 2 days ago    No otc meds taken

## 2023-04-04 NOTE — PROGRESS NOTES
Assessment & Plan     Gestational diabetes mellitus (GDM), antepartum, gestational diabetes method of control unspecified  No history of insulin use  A1c in the prediabetic range  - Hemoglobin A1c; Future  - no diabetic medications at this time     Lipid screening  LDL of 163 with cholesterol of 249. ASCVD risk 0.7%.   - CBC with platelets; Future  - Comprehensive metabolic panel (BMP + Alb, Alk Phos, ALT, AST, Total. Bili, TP); Future  - Lipid Profile (Chol, Trig, HDL, LDL calc); Future  - work on diet and exercise     Nevus sebaceous  - Adult Dermatology Referral; Future, [facility]    Hip pain, bilateral  Will start with PT  - Physical Therapy Referral; Future    Family history of thyroid disorder  Sisters with thyroid issues  - TSH with free T4 reflex; Future    Dizziness  Will start with baseline labs. TSH wnl  TM are retracted, so consideration for vestibular etiology  Bradycardic on exam, so consideration for ziopatch if labs are normal. Dizziness / lightheaded w/ activity, but infrequent     - order placed for ziopatch d/t occurs with activity and labs wnl    32 minutes spent on the date of the encounter doing chart review, review of test results, interpretation of tests, patient visit, documentation        See Patient Instructions    Return in about 3 months (around 7/5/2023) for Rechecking , Physical Exam.    Negin Mohr MD  [facility] Joseph is a 37 year old, presenting for the following health issues:  Establish Care         View : No data to display.              HPI     Establish care: followed with Dr. Denisha Barrios with last visit 3/6/2019      # skin lesion on face: visited Dr. Barillas on 2/20/2014 dx with nevus sebaceous   - drains material       # Hip pain:   - started after twins  - pain is worse with sitting  - taking ibu for pain   -     #  A1c - d/t gestational diabetes d/t twins  - was not on insulin     # FHx of hyperthyroid and  hypothyroid: w/ sisters    # dizziness / lighthead: duration of one year   - sitting in a meeting and got lighthead  - does not feel like the room was spinning  - other episodes with activity (walking)  - generally happens every other month   - will happens with quick change of position   - keeps hydration and no issues with food     # Social   - works as  at [employer], works night shift    Review of Systems   Constitutional: Negative for chills and fever.   HENT: Positive for ear pain (occasional ). Negative for congestion.    Respiratory: Negative for shortness of breath.    Cardiovascular: Negative for chest pain, palpitations and peripheral edema.   Gastrointestinal: Negative for abdominal pain.   Musculoskeletal: Positive for arthralgias (hip pain).   Neurological: Positive for dizziness, light-headedness and headaches.          Objective    /72 (BP Location: Left arm, Patient Position: Chair, Cuff Size: Adult Regular)   Pulse 57   Temp 98.3  F (36.8  C) (Tympanic)   Resp 17   Wt 74.1 kg (163 lb 6 oz)   SpO2 97%   BMI 33.00 kg/m    Body mass index is 33 kg/m .  Physical Exam  Constitutional:       General: She is not in acute distress.     Appearance: She is not ill-appearing.   HENT:      Right Ear: Tympanic membrane is retracted.      Left Ear: Tympanic membrane is retracted.   Cardiovascular:      Rate and Rhythm: Regular rhythm. Bradycardia present.      Heart sounds: No murmur heard.  Pulmonary:      Effort: Pulmonary effort is normal. No respiratory distress.      Breath sounds: No wheezing or rales.   Neurological:      Mental Status: She is alert.              Results for orders placed or performed in visit on 04/05/23 (from the past 24 hour(s))   Hemoglobin A1c   Result Value Ref [facility]    Estimated Average Glucose 120 mg/dL    Hemoglobin A1C 5.8 (H) <5.7 %   CBC with platelets   Result Value Ref [facility]    WBC Count 9.2 4.0 - 11.0 10e3/uL    RBC Count 4.59 3.80 - 5.20 10e6/uL     Hemoglobin 13.3 11.7 - 15.7 g/dL    Hematocrit 40.3 35.0 - 47.0 %    MCV 88 78 - 100 fL    MCH 29.0 26.5 - 33.0 pg    MCHC 33.0 31.5 - 36.5 g/dL    RDW 13.1 10.0 - 15.0 %    Platelet Count 259 150 - 450 10e3/uL   Comprehensive metabolic panel (BMP + Alb, Alk Phos, ALT, AST, Total. Bili, TP)   Result Value Ref [facility]    Sodium 140 136 - 145 mmol/L    Potassium 3.7 3.4 - 5.3 mmol/L    Chloride 105 98 - 107 mmol/L    Carbon Dioxide (CO2) 26 22 - 29 mmol/L    Anion Gap 9 7 - 15 mmol/L    Urea Nitrogen 13.4 6.0 - 20.0 mg/dL    Creatinine 0.65 0.51 - 0.95 mg/dL    Calcium 9.3 8.6 - 10.0 mg/dL    Glucose 106 (H) 70 - 99 mg/dL    Alkaline Phosphatase 79 35 - 104 U/L    AST 15 10 - 35 U/L    ALT 18 10 - 35 U/L    Protein Total 6.9 6.4 - 8.3 g/dL    Albumin 4.0 3.5 - 5.2 g/dL    Bilirubin Total 0.2 <=1.2 mg/dL    GFR Estimate >90 >60 mL/min/1.73m2   Lipid Profile (Chol, Trig, HDL, LDL calc)   Result Value Ref [facility]    Cholesterol 249 (H) <200 mg/dL    Triglycerides 144 <150 mg/dL    Direct Measure HDL 57 >=50 mg/dL    LDL Cholesterol Calculated 163 (H) <=100 mg/dL    Non HDL Cholesterol 192 (H) <130 mg/dL    Narrative    Cholesterol  Desirable:  <200 mg/dL    Triglycerides  Normal:  Less than 150 mg/dL  Borderline High:  150-199 mg/dL  High:  200-499 mg/dL  Very High:  Greater than or equal to 500 mg/dL    Direct Measure HDL  Female:  Greater than or equal to 50 mg/dL   Male:  Greater than or equal to 40 mg/dL    LDL Cholesterol  Desirable:  <100mg/dL  Above Desirable:  100-129 mg/dL   Borderline High:  130-159 mg/dL   High:  160-189 mg/dL   Very High:  >= 190 mg/dL    Non HDL Cholesterol  Desirable:  130 mg/dL  Above Desirable:  130-159 mg/dL  Borderline High:  160-189 mg/dL  High:  190-219 mg/dL  Very High:  Greater than or equal to 220 mg/dL   TSH with free T4 reflex   Result Value Ref [facility]    TSH 1.31 0.30 - 4.20 uIU/mL

## 2023-04-05 ENCOUNTER — OFFICE VISIT (OUTPATIENT)
Dept: FAMILY MEDICINE | Facility: OTHER | Age: 38
End: 2023-04-05
Attending: FAMILY MEDICINE
Payer: COMMERCIAL

## 2023-04-05 VITALS
RESPIRATION RATE: 17 BRPM | HEART RATE: 57 BPM | TEMPERATURE: 98.3 F | BODY MASS INDEX: 33 KG/M2 | DIASTOLIC BLOOD PRESSURE: 72 MMHG | WEIGHT: 163.38 LBS | OXYGEN SATURATION: 97 % | SYSTOLIC BLOOD PRESSURE: 120 MMHG

## 2023-04-05 DIAGNOSIS — D22.9 NEVUS SEBACEOUS: ICD-10-CM

## 2023-04-05 DIAGNOSIS — Z83.49 FAMILY HISTORY OF THYROID DISORDER: ICD-10-CM

## 2023-04-05 DIAGNOSIS — M25.551 HIP PAIN, BILATERAL: ICD-10-CM

## 2023-04-05 DIAGNOSIS — R42 DIZZINESS: ICD-10-CM

## 2023-04-05 DIAGNOSIS — O24.419 GESTATIONAL DIABETES MELLITUS (GDM), ANTEPARTUM, GESTATIONAL DIABETES METHOD OF CONTROL UNSPECIFIED: Primary | ICD-10-CM

## 2023-04-05 DIAGNOSIS — M25.552 HIP PAIN, BILATERAL: ICD-10-CM

## 2023-04-05 DIAGNOSIS — Z13.220 LIPID SCREENING: ICD-10-CM

## 2023-04-05 LAB
ALBUMIN SERPL BCG-MCNC: 4 G/DL (ref 3.5–5.2)
ALP SERPL-CCNC: 79 U/L (ref 35–104)
ALT SERPL W P-5'-P-CCNC: 18 U/L (ref 10–35)
ANION GAP SERPL CALCULATED.3IONS-SCNC: 9 MMOL/L (ref 7–15)
AST SERPL W P-5'-P-CCNC: 15 U/L (ref 10–35)
BILIRUB SERPL-MCNC: 0.2 MG/DL
BUN SERPL-MCNC: 13.4 MG/DL (ref 6–20)
CALCIUM SERPL-MCNC: 9.3 MG/DL (ref 8.6–10)
CHLORIDE SERPL-SCNC: 105 MMOL/L (ref 98–107)
CHOLEST SERPL-MCNC: 249 MG/DL
CREAT SERPL-MCNC: 0.65 MG/DL (ref 0.51–0.95)
DEPRECATED HCO3 PLAS-SCNC: 26 MMOL/L (ref 22–29)
ERYTHROCYTE [DISTWIDTH] IN BLOOD BY AUTOMATED COUNT: 13.1 % (ref 10–15)
EST. AVERAGE GLUCOSE BLD GHB EST-MCNC: 120 MG/DL
GFR SERPL CREATININE-BSD FRML MDRD: >90 ML/MIN/1.73M2
GLUCOSE SERPL-MCNC: 106 MG/DL (ref 70–99)
HBA1C MFR BLD: 5.8 %
HCT VFR BLD AUTO: 40.3 % (ref 35–47)
HDLC SERPL-MCNC: 57 MG/DL
HGB BLD-MCNC: 13.3 G/DL (ref 11.7–15.7)
LDLC SERPL CALC-MCNC: 163 MG/DL
MCH RBC QN AUTO: 29 PG (ref 26.5–33)
MCHC RBC AUTO-ENTMCNC: 33 G/DL (ref 31.5–36.5)
MCV RBC AUTO: 88 FL (ref 78–100)
NONHDLC SERPL-MCNC: 192 MG/DL
PLATELET # BLD AUTO: 259 10E3/UL (ref 150–450)
POTASSIUM SERPL-SCNC: 3.7 MMOL/L (ref 3.4–5.3)
PROT SERPL-MCNC: 6.9 G/DL (ref 6.4–8.3)
RBC # BLD AUTO: 4.59 10E6/UL (ref 3.8–5.2)
SODIUM SERPL-SCNC: 140 MMOL/L (ref 136–145)
TRIGL SERPL-MCNC: 144 MG/DL
TSH SERPL DL<=0.005 MIU/L-ACNC: 1.31 UIU/ML (ref 0.3–4.2)
WBC # BLD AUTO: 9.2 10E3/UL (ref 4–11)

## 2023-04-05 PROCEDURE — 85027 COMPLETE CBC AUTOMATED: CPT | Performed by: FAMILY MEDICINE

## 2023-04-05 PROCEDURE — 36415 COLL VENOUS BLD VENIPUNCTURE: CPT | Performed by: FAMILY MEDICINE

## 2023-04-05 PROCEDURE — 99214 OFFICE O/P EST MOD 30 MIN: CPT | Performed by: FAMILY MEDICINE

## 2023-04-05 PROCEDURE — 80053 COMPREHEN METABOLIC PANEL: CPT | Performed by: FAMILY MEDICINE

## 2023-04-05 PROCEDURE — 83036 HEMOGLOBIN GLYCOSYLATED A1C: CPT | Performed by: FAMILY MEDICINE

## 2023-04-05 PROCEDURE — 84443 ASSAY THYROID STIM HORMONE: CPT | Performed by: FAMILY MEDICINE

## 2023-04-05 PROCEDURE — 80061 LIPID PANEL: CPT | Performed by: FAMILY MEDICINE

## 2023-04-05 ASSESSMENT — ENCOUNTER SYMPTOMS
HEADACHES: 1
PALPITATIONS: 0
FEVER: 0
ARTHRALGIAS: 1
SHORTNESS OF BREATH: 0
DIZZINESS: 1
CHILLS: 0
ABDOMINAL PAIN: 0
LIGHT-HEADEDNESS: 1

## 2023-04-05 ASSESSMENT — PAIN SCALES - GENERAL: PAINLEVEL: NO PAIN (0)

## 2023-04-05 NOTE — PATIENT INSTRUCTIONS
It was nice to meet you today.     We will call you with your lab results.     We put in a referral to dermatology and physical therapy

## 2023-04-10 ENCOUNTER — HOSPITAL ENCOUNTER (OUTPATIENT)
Dept: CARDIOLOGY | Facility: HOSPITAL | Age: 38
Discharge: HOME | End: 2023-04-10
Attending: FAMILY MEDICINE | Admitting: INTERNAL MEDICINE
Payer: COMMERCIAL

## 2023-04-10 DIAGNOSIS — R42 DIZZINESS: ICD-10-CM

## 2023-04-10 PROCEDURE — 93246 EXT ECG>7D<15D RECORDING: CPT

## 2023-04-10 PROCEDURE — 93248 EXT ECG>7D<15D REV&INTERPJ: CPT | Performed by: INTERNAL MEDICINE

## 2023-04-10 NOTE — PROGRESS NOTES
Zio patch placed on patient in outpatient appointment today. Patient was instructed to wear patch for 14 days. Skin was prepped and patch was placed following [employer] guidelines .     Patient was instructed to push button when symptomatic and fill out diary. Patient was instructed not to submerse in water and no swimming, saunas, or hot tubs. Showers may be taken keeping back towards the water. If the area DOES become wet pat it dry with a cloth. If skin irritation occurs patient was instructed to remove patch and contact [employer].    Patient understands we do not receive results until they mail patch back inside the box. Staff reminded patient of outside billing notice which was explained to patient during the scheduling process of this monitor. Patient also instructed to contact [employer] with any questions [phone].    Patient had no further questions and agreed with plan. Patient was sent home with the box, information pamphlet and booklet to guy any incidents in.

## 2023-04-19 ENCOUNTER — HOSPITAL ENCOUNTER (OUTPATIENT)
Dept: PHYSICAL THERAPY | Facility: HOSPITAL | Age: 38
Setting detail: THERAPIES SERIES
Discharge: HOME | End: 2023-04-19
Attending: FAMILY MEDICINE
Payer: COMMERCIAL

## 2023-04-19 DIAGNOSIS — M25.551 HIP PAIN, BILATERAL: ICD-10-CM

## 2023-04-19 DIAGNOSIS — M25.552 HIP PAIN, BILATERAL: ICD-10-CM

## 2023-04-19 PROCEDURE — 97110 THERAPEUTIC EXERCISES: CPT | Mod: GP

## 2023-04-19 PROCEDURE — 97161 PT EVAL LOW COMPLEX 20 MIN: CPT | Mod: GP

## 2023-04-19 PROCEDURE — 97140 MANUAL THERAPY 1/> REGIONS: CPT | Mod: GP

## 2023-04-19 ASSESSMENT — ACTIVITIES OF DAILY LIVING (ADL)
STANDING_FOR_15_MINUTES: NO DIFFICULTY AT ALL
ROLLING_OVER_IN_BED: MODERATE DIFFICULTY
HEAVY_WORK: NO DIFFICULTY AT ALL
WALKING_15_MINUTES_OR_GREATER: NO DIFFICULTY AT ALL
WALKING_APPROXIMATELY_10_MINUTES: NO DIFFICULTY AT ALL
WALKING_UP_STEEP_HILLS: NO DIFFICULTY AT ALL
HOS_ADL_ITEM_SCORE_TOTAL: 65
SITTING_FOR_15_MINUTES: NO DIFFICULTY AT ALL
WALKING_DOWN_STEEP_HILLS: NO DIFFICULTY AT ALL
PUTTING_ON_SOCKS_AND_SHOES: NO DIFFICULTY AT ALL
DEEP_SQUATTING: NO DIFFICULTY AT ALL
GETTING_INTO_AND_OUT_OF_A_BATHTUB: NO DIFFICULTY AT ALL
LIGHT_TO_MODERATE_WORK: NO DIFFICULTY AT ALL
RECREATIONAL_ACTIVITIES: NO DIFFICULTY AT ALL
GOING_UP_1_FLIGHT_OF_STAIRS: NO DIFFICULTY AT ALL
HOS_ADL_COUNT: 17
GETTING_INTO_AND_OUT_OF_AN_AVERAGE_CAR: SLIGHT DIFFICULTY
STEPPING_UP_AND_DOWN_CURBS: NO DIFFICULTY AT ALL
WALKING_INITIALLY: NO DIFFICULTY AT ALL
GOING_DOWN_1_FLIGHT_OF_STAIRS: NO DIFFICULTY AT ALL
HOS_ADL_SCORE(%): 95.59
HOS_ADL_HIGHEST_POTENTIAL_SCORE: 68
TWISTING/PIVOTING_ON_INVOLVED_LEG: NO DIFFICULTY AT ALL

## 2023-04-24 NOTE — PROGRESS NOTES
04/19/23 0800   General Information   Type of Visit Initial OP Ortho PT Evaluation   Start of Care Date 04/19/23   Referring Physician Dr. Negin Mohr MD   Patient/Family Goals Statement Relieve Pain   Orders Evaluate and Treat   Date of Order 04/19/23   Certification Required? Yes   Medical Diagnosis B Hip Pain   Surgical/Medical history reviewed Yes   Precautions/Limitations no known precautions/limitations   Weight-Bearing Status - LUE full weight-bearing   Weight-Bearing Status - RUE full weight-bearing   Weight-Bearing Status - LLE full weight-bearing   Weight-Bearing Status - RLE full weight-bearing   General Information Comments Opal arrived to therapy today stating she has B hip pain. She notes she always has a little ache but notes it can get really painful. She notes she can't sleep on her sides anymore due to increased pain. She notes she has the same pain on B hips. She notes the pain began after the birth of her twins and her twins are now 6 years old.  She notes she really had the pain begin last fall. Breann notes she falls all the time noting she is very clumsy. She notes she had R sided sciatic pain when she was pregnant. She notes her pain to be a 1 at the best and a 5 at the worst. Sh notes if she has to sit a lot work for her pain typically becomes worse. She notes she drove to [place] recently and this was super painful/stiff when she arrived. 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 She notes she is agreeable to work with the PT today.       Present No   Body Part(s)   Body Part(s) Hip   Presentation and Etiology   Pertinent history of current problem (include personal factors and/or comorbidities that impact the POC) B Hip Pain - notes pain began after childbirth   Impairments A. Pain;E. Decreased flexibility   Functional Limitations perform activities of daily living;perform required work activities;perform desired leisure / sports activities    Symptom Location B Anterior Hips   How/Where did it occur Other  (after giving birth to the twins 6 years ago)   Onset date of current episode/exacerbation 04/19/23   Chronicity Chronic  (after birth of her twins in 8-2017)   Pain rating (0-10 point scale) Worst (/10);Best (/10)   Best (/10) 1   Worst (/10) 5   Pain quality B. Dull;C. Aching;F. Stabbing   Frequency of pain/symptoms B. Intermittent   Pain/symptoms are: The same all the time   Pain/symptoms exacerbated by A. Sitting;E. Rest;G. Certain positions   Pain/symptoms eased by E. Changing positions;I. OTC medication(s)   Progression of symptoms since onset: Worsened   Prior Level of Function   Prior Level of Function-Mobility Indep with all tasks prior to injury   Prior Level of Function-ADLs Indep with all tasks prior to injury   Current Level of Function   Patient role/employment history A. Employed   Living environment House/townhome   Current equipment-Gait/Locomotion None   Current equipment-ADL None   Fall Risk Screen   Fall screen completed by PT   Have you fallen 2 or more times in the past year? No   Have you fallen and had an injury in the past year? No   Is patient a fall risk? No   Abuse Screen (yes response referral indicated)   Feels Unsafe at Home or Work/School no   Feels Threatened by Someone no   Does Anyone Try to Keep You From Having Contact with Others or Doing Things Outside Your Home? no   Physical Signs of Abuse Present no   Patient needs abuse support services and resources No   Hip Objective Findings   Side (if bilateral, select both right and left) Right;Left   Femoral Nerve Stretch Test Pos   Gluteal Tendopathy Test Pos   Resisted Hip Adduction Test Neg   Straight Leg Raise Test Pos   Scour Test Neg   CRISTAL Test Pos   FADIR Test Pos   Palpation Tenderness to palpation located on B iliopsoas and iliacus   Todd Flexibility Test Pos   Obers/ITB Flexibility Pos   Right Hamstring Flexibility Pos   Left Hamstring Flexibility Pos    Right Piriformis Flexibility Pos   Left Piriformis Flexibility Pos   Right Prone Quad Flexibility Pos   Left Prone Quad Flexibility Pos   Right Hip Flexion PROM WFL   Left Hip Flexion PROM WFL   Right Hip Abduction PROM WFL   Left Hip Abduction PROM WFL   Right Hip Adduction PROM WFL   Left Hip Adduction PROM WFL   Right Hip ER PROM WFL   Left Hip ER PROM WFL   Right Hip IR PROM WFL   Left Hip IR PROM WFL   Right Hip Ext PROM WFL   Left Hip Ext PROM WFL   Right Hip Flexion Strength 4/5   Left Hip Flexion Strength 4/5   Right Hip Abduction Strength 4/5   Left Hip Abduction Strength 4/5   Right Hip Extension Strength 4/5   Left Hip Extension Strength 4/5   Right Hip IR Strength 4/5   Left Hip IR Strength 4/5   Right Hip ER Strength 4/5   Left Hip ER Strength 4/5   Right Knee Flexion Strength 4/5   Left Knee Flexion Strength 4/5   Right Knee Extension Strength 4/5   Left Knee Extension Strength 4/5   Planned Therapy Interventions   Planned Therapy Interventions gait training;joint mobilization;manual therapy;neuromuscular re-education;ROM;strengthening;stretching   Planned Modality Interventions   Planned Modality Interventions Cryotherapy;Hot packs;Electrical stimulation;Iontophoresis;TENS;Traction;Ultrasound   Clinical Impression   Criteria for Skilled Therapeutic Interventions Met yes, treatment indicated   PT Diagnosis B Hip Pain   Influenced by the following impairments Pain; Weakness; Decreased Strength   Functional limitations due to impairments Difficulty completing work tasks and ADLs without pain   Clinical Presentation Stable/Uncomplicated   Clinical Presentation Rationale Due to the patient's inability to complete functional ADLs without pain, she is requesting PT services at this time.   Clinical Decision Making (Complexity) Low complexity   Therapy Frequency 2 times/Week   Predicted Duration of Therapy Intervention (days/wks) 10 weeks   Risk & Benefits of therapy have been explained Yes   Patient,  Family & other staff in agreement with plan of care Yes   Education Assessment   Preferred Learning Style Listening   Barriers to Learning No barriers   ORTHO GOALS   PT Ortho Eval Goals 3;2;1   Ortho Goal 1   Goal Identifier STG-1   Goal Description Patient will be able to walk 100 yards without pain to allow her to complete work pain-free.   Goal Progress New   Target Date 05/19/23   Ortho Goal 2   Goal Identifier STG-2   Goal Description Patient will be able to sit and stand without pain to allow her to transition to multiple places within her work space.   Goal Progress New   Target Date 05/19/23   Ortho Goal 3   Goal Identifier LTG-1   Goal Description Patient will possess a custom HEP to allow her to self-manage and prevent recurrence.   Goal Progress New   Target Date 07/17/23   Total Evaluation Time   PT Eval, Low Complexity Minutes (70087) 14   Therapy Certification   Certification date from 04/19/23   Certification date to 07/18/23   Medical Diagnosis B Hip Pain   I certify the need for these services furnished under this plan of treatment and while under my care. (Physician co-signature of this document indicates review and certification of the therapy plan).

## 2023-04-26 ENCOUNTER — HOSPITAL ENCOUNTER (OUTPATIENT)
Dept: PHYSICAL THERAPY | Facility: HOSPITAL | Age: 38
Setting detail: THERAPIES SERIES
Discharge: HOME | End: 2023-04-26
Attending: FAMILY MEDICINE
Payer: COMMERCIAL

## 2023-04-26 PROCEDURE — 97140 MANUAL THERAPY 1/> REGIONS: CPT | Mod: GP

## 2023-04-26 PROCEDURE — 97110 THERAPEUTIC EXERCISES: CPT | Mod: GP

## 2023-05-03 ENCOUNTER — HOSPITAL ENCOUNTER (OUTPATIENT)
Dept: PHYSICAL THERAPY | Facility: HOSPITAL | Age: 38
Setting detail: THERAPIES SERIES
Discharge: HOME | End: 2023-05-03
Attending: FAMILY MEDICINE
Payer: COMMERCIAL

## 2023-05-03 PROCEDURE — 97140 MANUAL THERAPY 1/> REGIONS: CPT | Mod: GP

## 2023-05-09 ENCOUNTER — HOSPITAL ENCOUNTER (OUTPATIENT)
Dept: PHYSICAL THERAPY | Facility: HOSPITAL | Age: 38
Setting detail: THERAPIES SERIES
Discharge: HOME | End: 2023-05-09
Attending: FAMILY MEDICINE
Payer: COMMERCIAL

## 2023-05-09 PROCEDURE — 97140 MANUAL THERAPY 1/> REGIONS: CPT | Mod: GP

## 2023-05-17 ENCOUNTER — THERAPY VISIT (OUTPATIENT)
Dept: PHYSICAL THERAPY | Facility: HOSPITAL | Age: 38
End: 2023-05-17
Attending: FAMILY MEDICINE
Payer: COMMERCIAL

## 2023-05-17 DIAGNOSIS — M25.551 BILATERAL HIP PAIN: Primary | ICD-10-CM

## 2023-05-17 DIAGNOSIS — M25.552 BILATERAL HIP PAIN: Primary | ICD-10-CM

## 2023-05-17 PROCEDURE — 97110 THERAPEUTIC EXERCISES: CPT | Mod: GP

## 2023-05-17 PROCEDURE — 97140 MANUAL THERAPY 1/> REGIONS: CPT | Mod: GP

## 2023-05-31 ENCOUNTER — THERAPY VISIT (OUTPATIENT)
Dept: PHYSICAL THERAPY | Facility: HOSPITAL | Age: 38
End: 2023-05-31
Attending: FAMILY MEDICINE
Payer: COMMERCIAL

## 2023-05-31 DIAGNOSIS — M25.552 BILATERAL HIP PAIN: Primary | ICD-10-CM

## 2023-05-31 DIAGNOSIS — M25.551 BILATERAL HIP PAIN: Primary | ICD-10-CM

## 2023-05-31 PROCEDURE — 97140 MANUAL THERAPY 1/> REGIONS: CPT | Mod: GP

## 2023-06-28 ENCOUNTER — THERAPY VISIT (OUTPATIENT)
Dept: PHYSICAL THERAPY | Facility: HOSPITAL | Age: 38
End: 2023-06-28
Attending: FAMILY MEDICINE
Payer: COMMERCIAL

## 2023-06-28 DIAGNOSIS — M25.552 BILATERAL HIP PAIN: Primary | ICD-10-CM

## 2023-06-28 DIAGNOSIS — M25.551 BILATERAL HIP PAIN: Primary | ICD-10-CM

## 2023-06-28 PROCEDURE — 97110 THERAPEUTIC EXERCISES: CPT | Mod: GP

## 2023-06-28 PROCEDURE — 97140 MANUAL THERAPY 1/> REGIONS: CPT | Mod: GP

## 2023-06-30 ENCOUNTER — HOSPITAL ENCOUNTER (EMERGENCY)
Facility: HOSPITAL | Age: 38
Discharge: HOME | End: 2023-06-30
Payer: COMMERCIAL

## 2023-06-30 ENCOUNTER — TELEPHONE (OUTPATIENT)
Dept: FAMILY MEDICINE | Facility: OTHER | Age: 38
End: 2023-06-30

## 2023-06-30 VITALS
HEART RATE: 66 BPM | SYSTOLIC BLOOD PRESSURE: 139 MMHG | WEIGHT: 158.73 LBS | BODY MASS INDEX: 32.06 KG/M2 | OXYGEN SATURATION: 97 % | DIASTOLIC BLOOD PRESSURE: 81 MMHG | RESPIRATION RATE: 16 BRPM | TEMPERATURE: 98.4 F

## 2023-06-30 DIAGNOSIS — H66.90 OTITIS MEDIA: ICD-10-CM

## 2023-06-30 PROCEDURE — G0463 HOSPITAL OUTPT CLINIC VISIT: HCPCS

## 2023-06-30 PROCEDURE — 99213 OFFICE O/P EST LOW 20 MIN: CPT

## 2023-06-30 RX ORDER — AMOXICILLIN 875 MG
875 TABLET ORAL 2 TIMES DAILY
Qty: 14 TABLET | Refills: 0 | Status: SHIPPED | OUTPATIENT
Start: 2023-06-30 | End: 2023-07-07

## 2023-06-30 ASSESSMENT — ENCOUNTER SYMPTOMS
DIZZINESS: 0
NAUSEA: 0
ACTIVITY CHANGE: 0
SHORTNESS OF BREATH: 0
CHILLS: 0
FEVER: 0
FATIGUE: 0
DIARRHEA: 0
COUGH: 0
APPETITE CHANGE: 0
SORE THROAT: 0
VOMITING: 0
HEADACHES: 0

## 2023-06-30 NOTE — ED PROVIDER NOTES
History     Chief Complaint   Patient presents with     Otalgia     HPI  Opal Henderson is a 37 year old female who presents to the urgent care with complaints of left sided ear pain and a sore throat that started last night. She denies headaches, dizziness, fevers, chills, cough, and n/v/d. No recent abx. Has taken ibuprofen this AM with minimal relief. Also took a decongestant last night without much change in pain.     Allergies:  No Known Allergies    Problem List:    Patient Active Problem List    Diagnosis Date Noted     Bilateral hip pain 2023     Priority: Medium     Granuloma annulare 2017     Priority: Medium     Non morbid obesity, unspecified obesity type 2017     Priority: Medium     Surgery, elective 2016     Priority: Medium      (spontaneous vaginal delivery) 2016     Priority: Medium     VTX/VTX Twins   delivery Ambrose 16  Intact  Boy [place]  Girl Flor  Desires BTO       Oligohydramnios 2016     Priority: Medium     GBS (group B Streptococcus carrier), +RV culture, currently pregnant 2016     Priority: Medium     ACP (advance care planning) 2016     Priority: Medium     Advance Care Planning 2016: ACP Review of Chart / Resources Provided:  Reviewed chart for advance care plan.  Opal Henderson has been provided information and resources to begin or update their advance care plan.  Added by Kadie Mcdaniel             Abnormal maternal glucose tolerance, antepartum 2016     Priority: Medium     GDM-Diabetes center referral       Encounter for sterilization 05/10/2016     Priority: Medium     Desires pp BTO.  Counseling and federal sterilization consent forms done.         Dichorionic diamniotic twin gestation 2016     Priority: Medium     Di-Di on early US  Suprise  Serial US growth  Nml Quad  Transfer of care from [facility] Dr. Barrios  Nml level 2 US  BC-Desires BTO  Peds:  Dr. Barrios  RHIG and TDAP done 28 weeks        Needle stick injury 02/07/2013     Priority: Medium     Overview:   [facility], primary blood negative per lab testing, 35wk exposure       Dysplasia of cervix, low grade (AMEE 1) 10/23/2012     Priority: Medium        Past Medical History:    Past Medical History:   Diagnosis Date     HGSIL on Pap smear of cervix 05/10/2005     Pap smear of cervix with ASCUS, cannot exclude HGSIL 10/16/2012     Reflux esophagitis 4/7/2009       Past Surgical History:    Past Surgical History:   Procedure Laterality Date     HERNIORRHAPHY UMBILICAL N/A 8/19/2016    Procedure: HERNIORRHAPHY UMBILICAL;  Surgeon: Mirza Baker MD;  Location: [facility]     LAPAROTOMY MINI, TUBAL LIGATION (POST PARTUM), COMBINED Bilateral 8/19/2016    Procedure: COMBINED LAPAROTOMY MINI, TUBAL LIGATION (POST PARTUM);  Surgeon: Mirza Baker MD;  Location: [facility] OR     PE TUBES  pt 10 years old       Family History:    Family History   Problem Relation Age of Onset     Asthma Sister      Asthma Brother      Heart Disease Maternal Grandmother      Hearing Loss Paternal Grandfather      Heart Disease Paternal Grandfather        Social History:  Marital Status:  Single [1]  Social History     Tobacco Use     Smoking status: Never     Smokeless tobacco: Never   Substance Use Topics     Alcohol use: Yes     Drug use: No        Medications:    amoxicillin (AMOXIL) 875 MG tablet          Review of Systems   Constitutional: Negative for activity change, appetite change, chills, fatigue and fever.   HENT: Positive for ear pain (left). Negative for sore throat.    Respiratory: Negative for cough and shortness of breath.    Gastrointestinal: Negative for diarrhea, nausea and vomiting.   Neurological: Negative for dizziness and headaches.   All other systems reviewed and are negative.      Physical Exam   BP: 139/81  Pulse: 66  Temp: 98.4  F (36.9  C)  Resp: 16  Weight: 72 kg (158 lb 11.7 oz)  SpO2: 97 %      Physical Exam  Vitals and nursing note reviewed.    Constitutional:       General: She is not in acute distress.     Appearance: Normal appearance. She is not ill-appearing or toxic-appearing.   HENT:      Right Ear: Tympanic membrane, ear canal and external ear normal. There is no impacted cerumen. Tympanic membrane is not erythematous or bulging.      Left Ear: Ear canal normal. There is no impacted cerumen. There is mastoid tenderness. Tympanic membrane is erythematous and bulging.      Mouth/Throat:      Mouth: Mucous membranes are moist.      Pharynx: Oropharynx is clear. No oropharyngeal exudate or posterior oropharyngeal erythema.   Cardiovascular:      Rate and Rhythm: Normal rate and regular rhythm.      Pulses: Normal pulses.      Heart sounds: Normal heart sounds. No murmur heard.  Pulmonary:      Effort: Pulmonary effort is normal. No respiratory distress.      Breath sounds: Normal breath sounds. No stridor. No wheezing.   Lymphadenopathy:      Cervical: No cervical adenopathy.   Skin:     General: Skin is warm and dry.      Capillary Refill: Capillary refill takes less than 2 seconds.   Neurological:      General: No focal deficit present.      Mental Status: She is alert and oriented to person, place, and time.         ED Course                 Procedures                No results found for this or any previous visit (from the past 24 hour(s)).    Medications - No data to display    Assessments & Plan (with Medical Decision Making)     I have reviewed the nursing notes.    I have reviewed the findings, diagnosis, plan and need for follow up with the patient.  Opal Henderson is a 37 year old female who presents to the urgent care with complaints of left sided ear pain and a sore throat that started last night. She denies headaches, dizziness, fevers, chills, cough, and n/v/d. No recent abx. Has taken ibuprofen this AM with minimal relief. Also took a decongestant last night without much change in pain.     MDM: VSS and afebrile. Lungs clear, heart  tones regular. Bulging and erythema to left TM. Right TM clear. There is no mastoid tenderness. No erythema to posterior oropharynx. She is not toxic in appearance. Will prescribe amoxicillin.     (H66.90) Otitis media  Plan: amoxicillin prescribed. Yogurt or probiotic with abx.     Tylenol and ibuprofen as needed for pain.     Return with any concerns. Understanding verbalized.         New Prescriptions    AMOXICILLIN (AMOXIL) 875 MG TABLET    Take 1 tablet (875 mg) by mouth 2 times daily for 7 days       Final diagnoses:   Otitis media       6/30/2023   [facility]     Haritha Ko, GEORGE  06/30/23 1006

## 2023-06-30 NOTE — DISCHARGE INSTRUCTIONS
Yogurt or probiotic with abx.     Tylenol and ibuprofen as needed for pain.     Return with any concerns.

## 2023-06-30 NOTE — ED TRIAGE NOTES
Patient presents to urgent care for left ear pain that started last night. Patient states no other symptoms. Patient took advil about 0730.

## 2023-06-30 NOTE — TELEPHONE ENCOUNTER
8:16 AM    Reason for Call: OVERBOOK    Patient is having the following symptoms: ear infection for 2 days.    The patient is requesting an appointment for today with Dr. Mohr or ANY PCP.    Was an appointment offered for this call? No  If yes : Appointment type              Date    Preferred method for responding to this message: Telephone Call  What is your phone number ? [phone]    If we cannot reach you directly, may we leave a detailed response at the number you provided? Yes    Can this message wait until your PCP/provider returns, if unavailable today? Tammy Enriquez

## 2023-07-24 ENCOUNTER — MYC MEDICAL ADVICE (OUTPATIENT)
Dept: FAMILY MEDICINE | Facility: OTHER | Age: 38
End: 2023-07-24

## 2023-07-24 DIAGNOSIS — M25.551 BILATERAL HIP PAIN: Primary | ICD-10-CM

## 2023-07-24 DIAGNOSIS — M25.552 BILATERAL HIP PAIN: Primary | ICD-10-CM

## 2023-07-28 ENCOUNTER — THERAPY VISIT (OUTPATIENT)
Dept: PHYSICAL THERAPY | Facility: HOSPITAL | Age: 38
End: 2023-07-28
Attending: FAMILY MEDICINE
Payer: COMMERCIAL

## 2023-07-28 DIAGNOSIS — M25.552 BILATERAL HIP PAIN: ICD-10-CM

## 2023-07-28 DIAGNOSIS — M25.551 BILATERAL HIP PAIN: ICD-10-CM

## 2023-07-28 PROCEDURE — 97140 MANUAL THERAPY 1/> REGIONS: CPT | Mod: GP

## 2023-07-28 PROCEDURE — 97110 THERAPEUTIC EXERCISES: CPT | Mod: GP

## 2023-07-31 ENCOUNTER — TELEPHONE (OUTPATIENT)
Dept: FAMILY MEDICINE | Facility: OTHER | Age: 38
End: 2023-07-31

## 2023-07-31 DIAGNOSIS — Z11.59 ENCOUNTER FOR SCREENING FOR OTHER VIRAL DISEASES: Primary | ICD-10-CM

## 2023-07-31 NOTE — TELEPHONE ENCOUNTER
4:05 PM    Reason for Call: Phone Call    Description: Patient calling she needs to have titers done for her schooling Hep B, MMR, Varicella, Tetanus and TB testing. She needs this done as soon as possible.    Was an appointment offered for this call? No  If yes : Appointment type              Date    Preferred method for responding to this message: Telephone Call  What is your phone number ? [phone]    If we cannot reach you directly, may we leave a detailed response at the number you provided? Yes    Can this message wait until your PCP/provider returns, if available today? No, She wants to speak to a nurse regarding this ASAP.

## 2023-08-01 NOTE — TELEPHONE ENCOUNTER
I did put in the hep B, MMR, TB testing and varicella testing. Her tetanus shot (which she got in 2016) should suffice as immunity and it would be unusual that they'd need a titer on that too. She is up to date on the tetanus shot. She can schedule a lab only appointment.

## 2023-09-13 ENCOUNTER — THERAPY VISIT (OUTPATIENT)
Dept: PHYSICAL THERAPY | Facility: HOSPITAL | Age: 38
End: 2023-09-13
Attending: FAMILY MEDICINE
Payer: COMMERCIAL

## 2023-09-13 DIAGNOSIS — M25.552 BILATERAL HIP PAIN: Primary | ICD-10-CM

## 2023-09-13 DIAGNOSIS — M25.551 BILATERAL HIP PAIN: Primary | ICD-10-CM

## 2023-09-13 PROCEDURE — 97110 THERAPEUTIC EXERCISES: CPT | Mod: GP

## 2023-09-13 PROCEDURE — 97140 MANUAL THERAPY 1/> REGIONS: CPT | Mod: GP

## 2023-10-02 ENCOUNTER — OFFICE VISIT (OUTPATIENT)
Dept: DERMATOLOGY | Facility: OTHER | Age: 38
End: 2023-10-02
Attending: FAMILY MEDICINE
Payer: COMMERCIAL

## 2023-10-02 VITALS
WEIGHT: 159 LBS | OXYGEN SATURATION: 98 % | RESPIRATION RATE: 16 BRPM | SYSTOLIC BLOOD PRESSURE: 116 MMHG | DIASTOLIC BLOOD PRESSURE: 78 MMHG | BODY MASS INDEX: 32.11 KG/M2 | HEART RATE: 63 BPM

## 2023-10-02 DIAGNOSIS — D22.9 NEVUS SEBACEOUS: ICD-10-CM

## 2023-10-02 PROCEDURE — 99202 OFFICE O/P NEW SF 15 MIN: CPT | Performed by: DERMATOLOGY

## 2023-10-02 ASSESSMENT — PAIN SCALES - GENERAL: PAINLEVEL: NO PAIN (0)

## 2023-10-02 NOTE — PROGRESS NOTES
Subjective: First visit for Opal who has had a nevus on her left cheek for many years.  She states that recently it has been irritated much of the time and despite trying a variety of topicals it persists.    She is not worried about it but finds it is a nuisance and she would like it removed.    Objective: On the left central cheek  is a pink white raised papular lesion does not show any brown or black pigment.  He has somewhat of a pebbly surface.  There is no evidence of infection or if any precancerous changes in my opinion.  It  measures approximately 1 x 1 cm.    Assessment: This could be a sebaceous lesion because of its biologic behavior and general appearance.  I do not think it is cancerous.    Recommendations: Advised that it would be best to have this excised by a plastic surgeon.  I will therefore refer the case to Dr. Goldberg.  Advised Opal that Dr. Goldberg's team will call her (if that is approved by Dr. Goldberg).    Photograph was taken and entered into Epic.    Return as needed.

## 2023-10-02 NOTE — LETTER
10/2/2023       RE: Opal Henderson  [address] [place] [address]     Dear Colleague,    Thank you for referring your patient, Opal Henderson, to the [facility]. Please see a copy of my visit note below.    Subjective: First visit for Opal who has had a nevus on her left cheek for many years.  She states that recently it has been irritated much of the time and despite trying a variety of topicals it persists.    She is not worried about it but finds it is a nuisance and she would like it removed.    Objective: On the left central cheek  is a pink white raised papular lesion does not show any brown or black pigment.  He has somewhat of a pebbly surface.  There is no evidence of infection or if any precancerous changes in my opinion.  It  measures approximately 1 x 1 cm.    Assessment: This could be a sebaceous lesion because of its biologic behavior and general appearance.  I do not think it is cancerous.    Recommendations: Advised that it would be best to have this excised by a plastic surgeon.  I will therefore refer the case to Dr. Goldberg.  Advised Opal that Dr. Goldberg's team will call her (if that is approved by Dr. Goldberg).    Photograph was taken and entered into Epic.    Return as needed.    Again, thank you for allowing me to participate in the care of your patient.      Sincerely,    KRISTIN Rene MD

## 2023-10-13 NOTE — PATIENT INSTRUCTIONS
Thank you for allowing Dr. Goldberg and our ENT team to participate in your care.  If your medications are too expensive, please give the nurse a call.  We can possibly change this medication.  If you have a scheduling or an appointment question please contact our Health Unit Coordinator at their direct line [phone].   ALL nursing questions or concerns can be directed to your ENT nurse, Isac, at: [phone]      POST PROCEDURE INSTRUCTIONS    Remove your dressing in 24 hours (If you have one)  Wash incision with Gentle Cleanser Twice Daily (Cetaphil, Baby Shampoo, etc)  Apply Bacitracin Ointment Three Times Daily; After 1 week, use Aquaphor Ointment   Cover with a clean dressing if in a dirty bebo environment or when wet/soiled  Keep incision clean and dry   Do NOT soak in water such as a tub bath or swimming   Do NOT put make-up, powders, hairspray, lotions, etc on the incision     You can apply ice to the surgical area to help reduce swelling. (no longer than 20 minutes at a time)    You can use acetaminophen(Tylenol) or the prescription you received for pain.     If you have any bleeding, cover the wound with clean gauze and hold pressure for 10 Minutes. If the bleeding does not stop or is heavy and profuse, call the clinic or go to the Urgent Care/Emergency Department.    SIGNS OF INFECTION ARE:  Redness, swelling, red streaks, pus, drainage, warmth, fever, increased pain, foul smell.   Contact your primary health care provider if you notice any of the warning signs.     FOLLOW - UP  Follow-up in clinic for a nurse only visit in 7 days for suture removal.   Pathology results will be called to you when they are back. Usually 7-10 days.      6 WEEKS POST PROCEDURE    Apply ANY type of lotion to the suture site(Example - Vaseline Intensive Care or Vitamin E)  Massage the surgical area 1-2 times daily in a circular motion for 5 minutes, for a period of 2 months. This will help the scar heal better.

## 2023-10-16 ENCOUNTER — PREP FOR PROCEDURE (OUTPATIENT)
Dept: OTOLARYNGOLOGY | Facility: OTHER | Age: 38
End: 2023-10-16

## 2023-10-16 ENCOUNTER — OFFICE VISIT (OUTPATIENT)
Dept: OTOLARYNGOLOGY | Facility: OTHER | Age: 38
End: 2023-10-16
Attending: OTOLARYNGOLOGY
Payer: COMMERCIAL

## 2023-10-16 ENCOUNTER — THERAPY VISIT (OUTPATIENT)
Dept: PHYSICAL THERAPY | Facility: HOSPITAL | Age: 38
End: 2023-10-16
Attending: FAMILY MEDICINE
Payer: COMMERCIAL

## 2023-10-16 VITALS
BODY MASS INDEX: 31.65 KG/M2 | WEIGHT: 157 LBS | HEART RATE: 62 BPM | OXYGEN SATURATION: 98 % | HEIGHT: 59 IN | DIASTOLIC BLOOD PRESSURE: 76 MMHG | TEMPERATURE: 98.4 F | SYSTOLIC BLOOD PRESSURE: 118 MMHG

## 2023-10-16 DIAGNOSIS — L98.9 SKIN LESION OF CHEEK: Primary | ICD-10-CM

## 2023-10-16 DIAGNOSIS — D22.9 NEVUS SEBACEOUS: Primary | ICD-10-CM

## 2023-10-16 DIAGNOSIS — D22.9 NEVUS SEBACEOUS: ICD-10-CM

## 2023-10-16 DIAGNOSIS — M25.551 BILATERAL HIP PAIN: Primary | ICD-10-CM

## 2023-10-16 DIAGNOSIS — M25.552 BILATERAL HIP PAIN: Primary | ICD-10-CM

## 2023-10-16 PROCEDURE — 97110 THERAPEUTIC EXERCISES: CPT | Mod: GP

## 2023-10-16 PROCEDURE — 97140 MANUAL THERAPY 1/> REGIONS: CPT | Mod: GP

## 2023-10-16 PROCEDURE — 99203 OFFICE O/P NEW LOW 30 MIN: CPT | Performed by: OTOLARYNGOLOGY

## 2023-10-16 RX ORDER — CEPHALEXIN 500 MG/1
500 CAPSULE ORAL 3 TIMES DAILY
Qty: 21 CAPSULE | Refills: 0 | Status: SHIPPED | OUTPATIENT
Start: 2023-10-16 | End: 2023-10-23

## 2023-10-16 ASSESSMENT — PAIN SCALES - GENERAL: PAINLEVEL: NO PAIN (0)

## 2023-10-16 NOTE — PROGRESS NOTES
Otolaryngology Consultation    Patient: Opal Henderson  : 1985    Patient presents with:  Consult: Nevus sebaceous.  Referred by, Dr. Damion Rene      HPI:  Opal Henderson (ANGIE) is a 38 year old female seen today for a left cheek facial skin lesion.  Present since birth changing and growing.  The area drains on a daily basis and seems to be spreading across her left cheek.  No pain or ulceration but it is bothersome.  No known history of carcinoma skin or melanoma.  Excess sun exposure with sun burns throughout life. No immunodeficiency.  There is no history of solid organ transplant.    He saw Dr. Rene on 10/2 and he felt his may be a nevus sebaceous    His exam:  left central cheek  is a pink white raised papular lesion does not show any brown or black pigment.  He has somewhat of a pebbly surface.  There is no evidence of infection or if any precancerous changes in my opinion. It  measures approximately 1 x 1 cm.     Assessment: This could be a sebaceous lesion because of its biologic behavior and general appearance.  I do not think it is cancerous.     Recommendations: Advised that it would be best to have this excised by a plastic surgeon.  I will therefore refer the case to Dr. Goldberg.          No current outpatient medications on file.       Allergies: Patient has no known allergies.     Past Medical History:   Diagnosis Date    HGSIL on Pap smear of cervix 05/10/2005    Pap smear of cervix with ASCUS, cannot exclude HGSIL 10/16/2012    Reflux esophagitis 2009       Past Surgical History:   Procedure Laterality Date    HERNIORRHAPHY UMBILICAL N/A 2016    Procedure: HERNIORRHAPHY UMBILICAL;  Surgeon: Mirza Baker MD;  Location: [facility]    LAPAROTOMY MINI, TUBAL LIGATION (POST PARTUM), COMBINED Bilateral 2016    Procedure: COMBINED LAPAROTOMY MINI, TUBAL LIGATION (POST PARTUM);  Surgeon: Mirza Baker MD;  Location: [facility] OR    PE TUBES  pt 9 years old       ENT family history  "reviewed    Social History     Tobacco Use    Smoking status: Never    Smokeless tobacco: Never   Substance Use Topics    Alcohol use: Yes    Drug use: No       Review of Systems  ROS: 10 point ROS neg other than the symptoms noted above in the HPI     Physical Exam  /76 (BP Location: Left arm, Patient Position: Sitting, Cuff Size: Adult Large)   Pulse 62   Temp 98.4  F (36.9  C) (Tympanic)   Ht 1.499 m (4' 11\")   Wt 71.2 kg (157 lb)   SpO2 98%   BMI 31.71 kg/m    General - The patient is well nourished and well developed, and appears to have good nutritional status.  Alert and oriented to person and place, answers questions and cooperates with examination appropriately.   Head and Face - Normocephalic and atraumatic, with no gross asymmetry noted.  The facial nerve is intact, with strong symmetric movements.  There is a left mid cheek 1.2 x 1.2 cm papular pink pebbly lesion.  No purulence or current drainage.  Photos in epic.  No ulceration  Voice and Breathing - The patient was breathing comfortably without the use of accessory muscles. There was no wheezing, stridor, or stertor.  The patients voice was clear and strong, and had appropriate pitch and quality.  No emile peripheral digital clubbing or cyanosis   Ears -The external auditory canals are patent  Eyes - Extraocular movements intact, and the pupils were reactive to light.  Sclera were not icteric or injected, conjunctiva were pink and moist.  Neck -no parotid or submandibular masses.  No palpable enlarged fixed cervical lymph nodes.  No neck cysts or unusual tenderness to palpation.   No palpable fixed thyroid nodules or concerning goiter.  The trachea is grossly midline.         Impression and Plan- Opal Henderson is a 38 year old female with:    ICD-10-CM    1. 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  These include but are not limited to IV sedation and local anesthesia, scar or keloid formation, infection, flap failure, change in appearance of surrounding facial structures,  numbness to the skin, injury to the facial nerve with permanent facial weakness which is exceedingly rare but possible.  Temporary weakness to the face may occur with the use of local anesthesia.    The patient expressed understanding.    She did not want to proceed with an office excision    All questions were answered.    I have instructed the patient on wound care and signs of infection.  Written instructions provided.    Sunscreen use and skin cancer preventive measures were reinforced    Mercy Goldberg D.O.  Otolaryngology/Head and Neck Surgery  Allergy

## 2023-11-13 NOTE — LETTER
10/16/2023         RE: Opal Henderson  [address] [facility] [address]        Dear Colleague,    Thank you for referring your patient, Opal Henderson, to the [facility]. Please see a copy of my visit note below.    Otolaryngology Consultation    Patient: Opal Henderson  : 1985    Patient presents with:  Consult: Nevus sebaceous.  Referred by, Dr. Damion Rene      HPI:  Opal Henderson (ANGIE) is a 38 year old female seen today for a left cheek facial skin lesion.  Present since birth changing and growing.  The area drains on a daily basis and seems to be spreading across her left cheek.  No pain or ulceration but it is bothersome.  No known history of carcinoma skin or melanoma.  Excess sun exposure with sun burns throughout life. No immunodeficiency.  There is no history of solid organ transplant.    He saw Dr. Rene on 10/2 and he felt his may be a nevus sebaceous    His exam:  left central cheek  is a pink white raised papular lesion does not show any brown or black pigment.  He has somewhat of a pebbly surface.  There is no evidence of infection or if any precancerous changes in my opinion. It  measures approximately 1 x 1 cm.     Assessment: This could be a sebaceous lesion because of its biologic behavior and general appearance.  I do not think it is cancerous.     Recommendations: Advised that it would be best to have this excised by a plastic surgeon.  I will therefore refer the case to Dr. Goldberg.          No current outpatient medications on file.       Allergies: Patient has no known allergies.     Past Medical History:   Diagnosis Date     HGSIL on Pap smear of cervix 05/10/2005     Pap smear of cervix with ASCUS, cannot exclude HGSIL 10/16/2012     Reflux esophagitis 2009       Past Surgical History:   Procedure Laterality Date     HERNIORRHAPHY UMBILICAL N/A 2016    Procedure: HERNIORRHAPHY UMBILICAL;  Surgeon: Mirza Baker MD;  Location:  Patient Quality Outreach    Patient is due for the following:   Diabetes -  A1C, Eye Exam, Microalbumin, and Foot Exam  Physical Preventive Adult Physical    Next Steps:   Schedule a Adult Preventative    Type of outreach:    Sent [employer] message.      Questions for provider review:    None           CHRISTINA KENNEDY, CMA         "[facility]     LAPAROTOMY MINI, TUBAL LIGATION (POST PARTUM), COMBINED Bilateral 8/19/2016    Procedure: COMBINED LAPAROTOMY MINI, TUBAL LIGATION (POST PARTUM);  Surgeon: Mirza Baker MD;  Location: [facility] OR     PE TUBES  pt 9 years old       ENT family history reviewed    Social History     Tobacco Use     Smoking status: Never     Smokeless tobacco: Never   Substance Use Topics     Alcohol use: Yes     Drug use: No       Review of Systems  ROS: 10 point ROS neg other than the symptoms noted above in the HPI     Physical Exam  /76 (BP Location: Left arm, Patient Position: Sitting, Cuff Size: Adult Large)   Pulse 62   Temp 98.4  F (36.9  C) (Tympanic)   Ht 1.499 m (4' 11\")   Wt 71.2 kg (157 lb)   SpO2 98%   BMI 31.71 kg/m    General - The patient is well nourished and well developed, and appears to have good nutritional status.  Alert and oriented to person and place, answers questions and cooperates with examination appropriately.   Head and Face - Normocephalic and atraumatic, with no gross asymmetry noted.  The facial nerve is intact, with strong symmetric movements.  There is a left mid cheek 1.2 x 1.2 cm papular pink pebbly lesion.  No purulence or current drainage.  Photos in epic.  No ulceration  Voice and Breathing - The patient was breathing comfortably without the use of accessory muscles. There was no wheezing, stridor, or stertor.  The patients voice was clear and strong, and had appropriate pitch and quality.  No emile peripheral digital clubbing or cyanosis   Ears -The external auditory canals are patent  Eyes - Extraocular movements intact, and the pupils were reactive to light.  Sclera were not icteric or injected, conjunctiva were pink and moist.  Neck -no parotid or submandibular masses.  No palpable enlarged fixed cervical lymph nodes.  No neck cysts or unusual tenderness to palpation.   No palpable fixed thyroid nodules or concerning goiter.  The trachea is grossly midline. "         Impression and Plan- Opal Henderson is a 38 year old female with:    ICD-10-CM    1. 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  These include but are not limited to IV sedation and local anesthesia, scar or keloid formation, infection, flap failure, change in appearance of surrounding facial structures,  numbness to the skin, injury to the facial nerve with permanent facial weakness which is exceedingly rare but possible.  Temporary weakness to the face may occur with the use of local anesthesia.    The patient expressed understanding.    She did not want to proceed with an office excision    All questions were answered.    I have instructed the patient on wound care and signs of infection.  Written instructions provided.    Sunscreen use and skin cancer preventive measures were reinforced    Mercy Goldberg D.O.  Otolaryngology/Head and Neck Surgery  Allergy       Again, thank you for allowing me to participate in the care of your patient.        Sincerely,        Mercy Goldberg MD

## 2023-12-08 NOTE — PATIENT INSTRUCTIONS
No ibuprofen (NSAIDs) 7 days before the procedure  Preparing for Your Surgery  Getting started  A nurse will call you to review your health history and instructions. They will give you an arrival time based on your scheduled surgery time. Please be ready to share:  Your doctor's clinic name and phone number  Your medical, surgical, and anesthesia history  A list of allergies and sensitivities  A list of medicines, including herbal treatments and over-the-counter drugs  Whether the patient has a legal guardian (ask how to send us the papers in advance)  Please tell us if you're pregnant--or if there's any chance you might be pregnant. Some surgeries may injure a fetus (unborn baby), so they require a pregnancy test. Surgeries that are safe for a fetus don't always need a test, and you can choose whether to have one.   If you have a child who's having surgery, please ask for a copy of Preparing for Your Child's Surgery.    Preparing for surgery  Within 10 to 30 days of surgery: Have a pre-op exam (sometimes called an H&P, or History and Physical). This can be done at a clinic or pre-operative center.  If you're having a , you may not need this exam. Talk to your care team.  At your pre-op exam, talk to your care team about all medicines you take. If you need to stop any medicines before surgery, ask when to start taking them again.  We do this for your safety. Many medicines can make you bleed too much during surgery. Some change how well surgery (anesthesia) drugs work.  Call your insurance company to let them know you're having surgery. (If you don't have insurance, call [phone].)  Call your clinic if there's any change in your health. This includes signs of a cold or flu (sore throat, runny nose, cough, rash, fever). It also includes a scrape or scratch near the surgery site.  If you have questions on the day of surgery, call your hospital or surgery center.  Eating and drinking guidelines  For your  safety: Unless your surgeon tells you otherwise, follow the guidelines below.  Eat and drink as usual until 8 hours before you arrive for surgery. After that, no food or milk.  Drink clear liquids until 2 hours before you arrive. These are liquids you can see through, like water, Gatorade, and Propel Water. They also include plain black coffee and tea (no cream or milk), candy, and breath mints. You can spit out gum when you arrive.  If you drink alcohol: Stop drinking it the night before surgery.  If your care team tells you to take medicine on the morning of surgery, it's okay to take it with a sip of water.  Preventing infection  Shower or bathe the night before and morning of your surgery. Follow the instructions your clinic gave you. (If no instructions, use regular soap.)  Don't shave or clip hair near your surgery site. We'll remove the hair if needed.  Don't smoke or vape the morning of surgery. You may chew nicotine gum up to 2 hours before surgery. A nicotine patch is okay.  Note: Some surgeries require you to completely quit smoking and nicotine. Check with your surgeon.  Your care team will make every effort to keep you safe from infection. We will:  Clean our hands often with soap and water (or an alcohol-based hand rub).  Clean the skin at your surgery site with a special soap that kills germs.  Give you a special gown to keep you warm. (Cold raises the risk of infection.)  Wear special hair covers, masks, gowns and gloves during surgery.  Give antibiotic medicine, if prescribed. Not all surgeries need antibiotics.  What to bring on the day of surgery  Photo ID and insurance card  Copy of your health care directive, if you have one  Glasses and hearing aids (bring cases)  You can't wear contacts during surgery  Inhaler and eye drops, if you use them (tell us about these when you arrive)  CPAP machine or breathing device, if you use them  A few personal items, if spending the night  If you have . . .  A  pacemaker, ICD (cardiac defibrillator) or other implant: Bring the ID card.  An implanted stimulator: Bring the remote control.  A legal guardian: Bring a copy of the certified (court-stamped) guardianship papers.  Please remove any jewelry, including body piercings. Leave jewelry and other valuables at home.  If you're going home the day of surgery  You must have a responsible adult drive you home. They should stay with you overnight as well.  If you don't have someone to stay with you, and you aren't safe to go home alone, we may keep you overnight. Insurance often won't pay for this.  After surgery  If it's hard to control your pain or you need more pain medicine, please call your surgeon's office.  Questions?   If you have any questions for your care team, list them here: _________________________________________________________________________________________________________________________________________________________________________ ____________________________________ ____________________________________ ____________________________________  For informational purposes only. Not to replace the advice of your health care provider. Copyright   2003, 2019 [employer]. All rights reserved. Clinically reviewed by Alexandra Petersen MD. [employer]ART[employer] 542318 - REV 12/22.

## 2023-12-08 NOTE — PROGRESS NOTES
[facility] [place]  [address]  [place] [address]  Phone: [phone]  Primary Provider: Negin Mohr  Pre-op Performing Provider: NEGIN MOHR      PREOPERATIVE EVALUATION:  Today's date: 12/14/2023    Opal is a 38 year old, presenting for the following:  Pre-Op Exam        Surgical Information:  Surgery/Procedure: left cheek lesion excision, possible flap and frozens  Surgery Location: [facility] [place] [facility]  Surgeon: Dr. Goldberg  Surgery Date: 12/27/2023  Time of Surgery: 7:50AM  Where patient plans to recover: At home with family  Fax number for surgical facility: Note does not need to be faxed, will be available electronically in Epic.    Assessment & Plan     The proposed surgical procedure is considered INTERMEDIATE risk.    Preop general physical exam  No concerning lab or exam findings  - Basic metabolic panel; Future  - CBC with platelets; Future    Nevus sebaceous  Reason for the procedure     Hypokalemia  Encourage potassium rich foods       - No identified additional risk factors other than previously addressed    Antiplatelet or Anticoagulation Medication Instructions:   - Patient is on no antiplatelet or anticoagulation medications.    Additional Medication Instructions:  Patient is on no additional chronic medications   - ibuprofen (Advil, Motrin): HOLD 7 day before surgery.     RECOMMENDATION:  APPROVAL GIVEN to proceed with proposed procedure, without further diagnostic evaluation.        Subjective       HPI related to upcoming procedure: Christen has had a lesion on her left facial cheek since birth. It has been changing and growing. She was evaluated by Dr Rene who felt it was a nevus sebaceous. She will be undergoing the above procedure           12/12/2023     2:40 AM   Preop Questions   1. Have you ever had a heart attack or stroke? No   2. Have you ever had surgery on your heart or blood vessels, such as a stent placement, a coronary artery bypass, or surgery  on an artery in your head, neck, heart, or legs? No   3. Do you have chest pain with activity? No   4. Do you have a history of  heart failure? No   5. Do you currently have a cold, bronchitis or symptoms of other infection? No   6. Do you have a cough, shortness of breath, or wheezing? YES - recent URI, symptoms mostly resolved   7. Do you or anyone in your family have previous history of blood clots? No   8. Do you or does anyone in your family have a serious bleeding problem such as prolonged bleeding following surgeries or cuts? No   9. Have you ever had problems with anemia or been told to take iron pills? No   10. Have you had any abnormal blood loss such as black, tarry or bloody stools, or abnormal vaginal bleeding? No   11. Have you ever had a blood transfusion? No   12. Are you willing to have a blood transfusion if it is medically needed before, during, or after your surgery? Yes   13. Have you or any of your relatives ever had problems with anesthesia? No   14. Do you have sleep apnea, excessive snoring or daytime drowsiness? No   15. Do you have any artifical heart valves or other implanted medical devices like a pacemaker, defibrillator, or continuous glucose monitor? No   16. Do you have artificial joints? No   17. Are you allergic to latex? No   18. Is there any chance that you may be pregnant? No     Health Care Directive:  Patient does not have a Health Care Directive or Living Will: Discussed advance care planning with patient; however, patient declined at this time.    Preoperative Review of :   reviewed - no record of controlled substances prescribed.      Status of Chronic Conditions:  See problem list for active medical problems.  Problems all longstanding and stable, except as noted/documented.  See ROS for pertinent symptoms related to these conditions.    Review of Systems   Constitutional:  Negative for chills and fever.   HENT:  Positive for congestion (mild).    Respiratory:   Positive for cough (rare). Negative for shortness of breath.    Cardiovascular:  Negative for chest pain and palpitations.   Gastrointestinal:  Positive for heartburn (twice per month). Negative for abdominal pain.   Genitourinary:  Negative for difficulty urinating.   Psychiatric/Behavioral:  Negative for mood changes.        Patient Active Problem List    Diagnosis Date Noted    Bilateral hip pain 2023     Priority: Medium    Granuloma annulare 2017     Priority: Medium    Non morbid obesity, unspecified obesity type 2017     Priority: Medium    Surgery, elective 2016     Priority: Medium     (spontaneous vaginal delivery) 2016     Priority: Medium     VTX/VTX Twins   delivery Ambrose 16  Intact  Boy Chase  Girl Flor  Desires BTO      Oligohydramnios 2016     Priority: Medium    GBS (group B Streptococcus carrier), +RV culture, currently pregnant 2016     Priority: Medium    ACP (advance care planning) 2016     Priority: Medium     Advance Care Planning 2016: ACP Review of Chart / Resources Provided:  Reviewed chart for advance care plan.  Opal SUNDEEP Guadarramaa has been provided information and resources to begin or update their advance care plan.  Added by Kadie Mcdaniel            Abnormal maternal glucose tolerance, antepartum 2016     Priority: Medium     GDM-Diabetes center referral      Encounter for sterilization 05/10/2016     Priority: Medium     Desires pp BTO.  Counseling and federal sterilization consent forms done.        Dichorionic diamniotic twin gestation 2016     Priority: Medium     Di-Di on early US  Suprise  Serial US growth  Nml Quad  Transfer of care from [facility] Dr. Barrios  Nml level 2 US  BC-Desires BTO  Peds:  Dr. Barrios  RHIG and TDAP done 28 weeks      Needle stick injury 2013     Priority: Medium     Overview:   [facility], primary blood negative per lab testing, 35wk exposure      Dysplasia of cervix,  low grade (AMEE 1) 10/23/2012     Priority: Medium      Past Medical History:   Diagnosis Date    HGSIL on Pap smear of cervix 05/10/2005    Pap smear of cervix with ASCUS, cannot exclude HGSIL 10/16/2012    Reflux esophagitis 04/07/2009     Past Surgical History:   Procedure Laterality Date    HERNIORRHAPHY UMBILICAL N/A 8/19/2016    Procedure: HERNIORRHAPHY UMBILICAL;  Surgeon: Mirza Baker MD;  Location: [facility] OR    LAPAROTOMY MINI, TUBAL LIGATION (POST PARTUM), COMBINED Bilateral 8/19/2016    Procedure: COMBINED LAPAROTOMY MINI, TUBAL LIGATION (POST PARTUM);  Surgeon: Mirza Baker MD;  Location: [facility] OR    PE TUBES  pt 10 years old     No current outpatient medications on file.       No Known Allergies     Social History     Tobacco Use    Smoking status: Never    Smokeless tobacco: Never   Substance Use Topics    Alcohol use: Yes     Family History   Problem Relation Age of Onset    Asthma Sister     Asthma Brother     Heart Disease Maternal Grandmother     Hearing Loss Paternal Grandfather     Heart Disease Paternal Grandfather     Asthma Sister     Thyroid Disease Sister     Asthma Brother      History   Drug Use No         Objective     /76   Pulse 54   Temp 98.8  F (37.1  C) (Tympanic)   Resp 17   Wt 74.5 kg (164 lb 3.2 oz)   SpO2 99%   BMI 33.16 kg/m      Physical Exam  Constitutional:       General: She is not in acute distress.     Appearance: She is well-developed.   HENT:      Head: Normocephalic and atraumatic.      Right Ear: Hearing and tympanic membrane normal.      Left Ear: Hearing and tympanic membrane normal.      Mouth/Throat:      Mouth: Mucous membranes are moist.      Pharynx: No oropharyngeal exudate.   Eyes:      Extraocular Movements: Extraocular movements intact.      Conjunctiva/sclera: Conjunctivae normal.   Neck:      Thyroid: No thyromegaly.   Cardiovascular:      Rate and Rhythm: Normal rate and regular rhythm.      Pulses: Normal pulses.      Heart sounds: Normal heart  sounds. No murmur heard.  Pulmonary:      Effort: Pulmonary effort is normal. No respiratory distress.      Breath sounds: Normal breath sounds. No wheezing or rales.   Abdominal:      General: Bowel sounds are normal. There is no distension.      Palpations: Abdomen is soft.      Tenderness: There is generalized abdominal tenderness (mild). There is no guarding.   Musculoskeletal:         General: Normal range of motion.      Cervical back: Normal range of motion and neck supple.      Right lower leg: No edema.      Left lower leg: No edema.   Lymphadenopathy:      Cervical: No cervical adenopathy.   Skin:     General: Skin is dry.   Neurological:      Mental Status: She is alert.   Psychiatric:         Mood and Affect: Mood normal.           Recent Labs   Lab Test 04/05/23  0903   HGB 13.3         POTASSIUM 3.7   CR 0.65   A1C 5.8*        Diagnostics:  Recent Results (from the past 24 hour(s))   Basic metabolic panel    Collection Time: 12/14/23  8:12 AM   Result Value Ref Range    Sodium 138 135 - 145 mmol/L    Potassium 3.3 (L) 3.4 - 5.3 mmol/L    Chloride 102 98 - 107 mmol/L    Carbon Dioxide (CO2) 25 22 - 29 mmol/L    Anion Gap 11 7 - 15 mmol/L    Urea Nitrogen 11.1 6.0 - 20.0 mg/dL    Creatinine 0.67 0.51 - 0.95 mg/dL    GFR Estimate >90 >60 mL/min/1.73m2    Calcium 9.2 8.6 - 10.0 mg/dL    Glucose 119 (H) 70 - 99 mg/dL   CBC with platelets    Collection Time: 12/14/23  8:12 AM   Result Value Ref Range    WBC Count 9.4 4.0 - 11.0 10e3/uL    RBC Count 4.57 3.80 - 5.20 10e6/uL    Hemoglobin 13.1 11.7 - 15.7 g/dL    Hematocrit 39.4 35.0 - 47.0 %    MCV 86 78 - 100 fL    MCH 28.7 26.5 - 33.0 pg    MCHC 33.2 31.5 - 36.5 g/dL    RDW 13.2 10.0 - 15.0 %    Platelet Count 257 150 - 450 10e3/uL        S/p tubal ligation (8/2016)    No EKG required, no history of coronary heart disease, significant arrhythmia, peripheral arterial disease or other structural heart disease.    Revised Cardiac Risk Index  (RCRI):  The patient has the following serious cardiovascular risks for perioperative complications:   - No serious cardiac risks = 0 points     RCRI Interpretation: 0 points: Class I (very low risk - 0.4% complication rate)         Signed Electronically by: Negin Mohr MD  Copy of this evaluation report is provided to requesting physician.

## 2023-12-14 ENCOUNTER — LAB (OUTPATIENT)
Dept: LAB | Facility: OTHER | Age: 38
End: 2023-12-14
Attending: FAMILY MEDICINE
Payer: COMMERCIAL

## 2023-12-14 ENCOUNTER — OFFICE VISIT (OUTPATIENT)
Dept: FAMILY MEDICINE | Facility: OTHER | Age: 38
End: 2023-12-14
Attending: FAMILY MEDICINE
Payer: COMMERCIAL

## 2023-12-14 VITALS
DIASTOLIC BLOOD PRESSURE: 76 MMHG | OXYGEN SATURATION: 99 % | RESPIRATION RATE: 17 BRPM | SYSTOLIC BLOOD PRESSURE: 120 MMHG | BODY MASS INDEX: 33.16 KG/M2 | WEIGHT: 164.2 LBS | TEMPERATURE: 98.8 F | HEART RATE: 54 BPM

## 2023-12-14 DIAGNOSIS — Z01.818 PREOP GENERAL PHYSICAL EXAM: ICD-10-CM

## 2023-12-14 DIAGNOSIS — D22.9 NEVUS SEBACEOUS: ICD-10-CM

## 2023-12-14 DIAGNOSIS — Z01.818 PREOP GENERAL PHYSICAL EXAM: Primary | ICD-10-CM

## 2023-12-14 DIAGNOSIS — E87.6 HYPOKALEMIA: ICD-10-CM

## 2023-12-14 LAB
ANION GAP SERPL CALCULATED.3IONS-SCNC: 11 MMOL/L (ref 7–15)
BUN SERPL-MCNC: 11.1 MG/DL (ref 6–20)
CALCIUM SERPL-MCNC: 9.2 MG/DL (ref 8.6–10)
CHLORIDE SERPL-SCNC: 102 MMOL/L (ref 98–107)
CREAT SERPL-MCNC: 0.67 MG/DL (ref 0.51–0.95)
DEPRECATED HCO3 PLAS-SCNC: 25 MMOL/L (ref 22–29)
EGFRCR SERPLBLD CKD-EPI 2021: >90 ML/MIN/1.73M2
ERYTHROCYTE [DISTWIDTH] IN BLOOD BY AUTOMATED COUNT: 13.2 % (ref 10–15)
GLUCOSE SERPL-MCNC: 119 MG/DL (ref 70–99)
HCT VFR BLD AUTO: 39.4 % (ref 35–47)
HGB BLD-MCNC: 13.1 G/DL (ref 11.7–15.7)
MCH RBC QN AUTO: 28.7 PG (ref 26.5–33)
MCHC RBC AUTO-ENTMCNC: 33.2 G/DL (ref 31.5–36.5)
MCV RBC AUTO: 86 FL (ref 78–100)
PLATELET # BLD AUTO: 257 10E3/UL (ref 150–450)
POTASSIUM SERPL-SCNC: 3.3 MMOL/L (ref 3.4–5.3)
RBC # BLD AUTO: 4.57 10E6/UL (ref 3.8–5.2)
SODIUM SERPL-SCNC: 138 MMOL/L (ref 135–145)
WBC # BLD AUTO: 9.4 10E3/UL (ref 4–11)

## 2023-12-14 PROCEDURE — 85027 COMPLETE CBC AUTOMATED: CPT

## 2023-12-14 PROCEDURE — 80048 BASIC METABOLIC PNL TOTAL CA: CPT

## 2023-12-14 PROCEDURE — 36415 COLL VENOUS BLD VENIPUNCTURE: CPT

## 2023-12-14 PROCEDURE — 99213 OFFICE O/P EST LOW 20 MIN: CPT | Performed by: FAMILY MEDICINE

## 2023-12-14 ASSESSMENT — PAIN SCALES - GENERAL: PAINLEVEL: NO PAIN (0)

## 2023-12-14 ASSESSMENT — ENCOUNTER SYMPTOMS
COUGH: 1
PALPITATIONS: 0
FEVER: 0
DIFFICULTY URINATING: 0
HEARTBURN: 1
SHORTNESS OF BREATH: 0
ABDOMINAL PAIN: 0
CHILLS: 0

## 2023-12-20 ENCOUNTER — ANESTHESIA EVENT (OUTPATIENT)
Dept: SURGERY | Facility: HOSPITAL | Age: 38
End: 2023-12-20
Payer: COMMERCIAL

## 2023-12-21 NOTE — ANESTHESIA PREPROCEDURE EVALUATION
Anesthesia Pre-Procedure Evaluation    Patient: Opal Henderson   MRN: [id number] : 1985        Procedure : Procedure(s):  EXCISION LEFT CHEEK LESION, POSSIBLE FLAP AND FROZENS          Past Medical History:   Diagnosis Date     HGSIL on Pap smear of cervix 05/10/2005     Pap smear of cervix with ASCUS, cannot exclude HGSIL 10/16/2012     Reflux esophagitis 2009      Past Surgical History:   Procedure Laterality Date     HERNIORRHAPHY UMBILICAL N/A 2016    Procedure: HERNIORRHAPHY UMBILICAL;  Surgeon: Mirza Baker MD;  Location: [facility]     LAPAROTOMY MINI, TUBAL LIGATION (POST PARTUM), COMBINED Bilateral 2016    Procedure: COMBINED LAPAROTOMY MINI, TUBAL LIGATION (POST PARTUM);  Surgeon: Mirza Baker MD;  Location: [facility] OR     PE TUBES  pt 10 years old      No Known Allergies   Social History     Tobacco Use     Smoking status: Never     Smokeless tobacco: Never   Substance Use Topics     Alcohol use: Yes      Wt Readings from Last 1 Encounters:   23 74.5 kg (164 lb 3.2 oz)        Anesthesia Evaluation   Pt has had prior anesthetic. Type: General.        ROS/MED HX  ENT/Pulmonary: Comment: Per pre-op note: recent URI, symptoms mostly resolved    (+)                              recent URI, resolved, >2 weeks symptom free:        Neurologic:  - neg neurologic ROS     Cardiovascular: Comment: Zio Patch: 2023 Ordered secondary to dizziness.  Worn for 14 days and 0 hr's.  After removing artifact, total time was 13 days and 18 hr's. Placed on 4/10/23 at 1:10 pm and completed on 23 at 1:10 pm.     Underlying rhythm was sinus.     Hrt rate ranged from 45 bpm, maximum heart rate of 171 bmp, averaging 72 bmp.     No significant bradycardia, pauses, Mobitz type II or 3rd degree heart block.     No atrial fibrillation on this study.     x1 triggered event and x0 diary entries.  These corresponded to NSR.     x0 runs of VT.       x0 runs of SVT.     Rare, <1% of PAC's, atrial couplets,  "atrial triplets, and PVC's.     No episodes of ventricular bigeminy.     No episodes of ventricular trigeminy.      - neg cardiovascular ROS     METS/Exercise Tolerance: >4 METS    Hematologic:  - neg hematologic  ROS     Musculoskeletal: Comment: Bilateral hip pain      GI/Hepatic: Comment: Hx HERNIORRHAPHY UMBILICAL    Pre-op: generalized mild abdominal tenderness without guarding    Gerd is once or twice a month     (+) GERD (twice per month experiences symptoms),                   Renal/Genitourinary:  - neg Renal ROS     Endo: Comment: Gestational diabetes: A1C 4/5/23 5.8    (+)               Obesity,       Psychiatric/Substance Use:  - neg psychiatric ROS     Infectious Disease:  - neg infectious disease ROS     Malignancy:  - neg malignancy ROS     Other: Comment: Left cheek lesion  Granuloma annulare    Hx tubal ligation           Physical Exam    Airway        Mallampati: III   TM distance: > 3 FB   Neck ROM: full   Mouth opening: > 3 cm    Respiratory Devices and Support         Dental       (+) Completely normal teeth      Cardiovascular   cardiovascular exam normal          Pulmonary   pulmonary exam normal            OUTSIDE LABS:  CBC:   Lab Results   Component Value Date    WBC 9.4 12/14/2023    WBC 9.2 04/05/2023    HGB 13.1 12/14/2023    HGB 13.3 04/05/2023    HCT 39.4 12/14/2023    HCT 40.3 04/05/2023     12/14/2023     04/05/2023     BMP:   Lab Results   Component Value Date     12/14/2023     04/05/2023    POTASSIUM 3.3 (L) 12/14/2023    POTASSIUM 3.7 04/05/2023    CHLORIDE 102 12/14/2023    CHLORIDE 105 04/05/2023    CO2 25 12/14/2023    CO2 26 04/05/2023    BUN 11.1 12/14/2023    BUN 13.4 04/05/2023    CR 0.67 12/14/2023    CR 0.65 04/05/2023     (H) 12/14/2023     (H) 04/05/2023     COAGS: No results found for: \"PTT\", \"INR\", \"FIBR\"  POC: No results found for: \"BGM\", \"HCG\", \"HCGS\"  HEPATIC:   Lab Results   Component Value Date    ALBUMIN 4.0 04/05/2023    " PROTTOTAL 6.9 04/05/2023    ALT 18 04/05/2023    AST 15 04/05/2023    ALKPHOS 79 04/05/2023    BILITOTAL 0.2 04/05/2023     OTHER:   Lab Results   Component Value Date    A1C 5.8 (H) 04/05/2023    CARMELITA 9.2 12/14/2023    TSH 1.31 04/05/2023       Anesthesia Plan    ASA Status:  2    NPO Status:  NPO Appropriate    Anesthesia Type: MAC.     - Reason for MAC: straight local not clinically adequate   Induction: Intravenous, Propofol.           Consents    Anesthesia Plan(s) and associated risks, benefits, and realistic alternatives discussed. Questions answered and patient/representative(s) expressed understanding.     - Discussed: Risks, Benefits and Alternatives for BOTH SEDATION and the PROCEDURE were discussed     - Discussed with:  Patient      - Extended Intubation/Ventilatory Support Discussed: No.      - Patient is DNR/DNI Status: No     Use of blood products discussed: No .     Postoperative Care            Comments:    Other Comments: Risks and benefits of MAC anesthetic discussed including dental damage, aspiration, loss of airway, conversion to general anesthetic, CV complications, MI, stroke, death. Pt wishes to proceed.  12/1423 [place]  Patient is on no additional chronic medications   - ibuprofen (Advil, Motrin): HOLD 7 day before surgery.     K+ 3.3 on 12/14/23 (was advised to eat foods high in potassium)    Same day Urine HCG ordered.             DIMPLE Christina CNP    I have reviewed the pertinent notes and labs in the chart from the past 30 days and (re)examined the patient.  Any updates or changes from those notes are reflected in this note.    # Hypokalemia: Lowest K = 3.3 mmol/L in last 30 days, will replace as needed

## 2023-12-27 ENCOUNTER — LAB (OUTPATIENT)
Dept: LAB | Facility: HOSPITAL | Age: 38
End: 2023-12-27
Attending: OTOLARYNGOLOGY
Payer: COMMERCIAL

## 2023-12-27 ENCOUNTER — ANESTHESIA (OUTPATIENT)
Dept: SURGERY | Facility: HOSPITAL | Age: 38
End: 2023-12-27
Payer: COMMERCIAL

## 2023-12-27 ENCOUNTER — HOSPITAL ENCOUNTER (OUTPATIENT)
Facility: HOSPITAL | Age: 38
Discharge: HOME | End: 2023-12-27
Attending: OTOLARYNGOLOGY | Admitting: OTOLARYNGOLOGY
Payer: COMMERCIAL

## 2023-12-27 VITALS
WEIGHT: 160 LBS | RESPIRATION RATE: 16 BRPM | TEMPERATURE: 97.1 F | OXYGEN SATURATION: 96 % | BODY MASS INDEX: 32.25 KG/M2 | DIASTOLIC BLOOD PRESSURE: 50 MMHG | HEART RATE: 61 BPM | SYSTOLIC BLOOD PRESSURE: 102 MMHG | HEIGHT: 59 IN

## 2023-12-27 DIAGNOSIS — Z01.818 PREOP GENERAL PHYSICAL EXAM: Primary | ICD-10-CM

## 2023-12-27 LAB — HCG UR QL: NEGATIVE

## 2023-12-27 PROCEDURE — 360N000075 HC SURGERY LEVEL 2, PER MIN: Performed by: OTOLARYNGOLOGY

## 2023-12-27 PROCEDURE — 999N000141 HC STATISTIC PRE-PROCEDURE NURSING ASSESSMENT: Performed by: OTOLARYNGOLOGY

## 2023-12-27 PROCEDURE — 258N000003 HC RX IP 258 OP 636: Performed by: NURSE ANESTHETIST, CERTIFIED REGISTERED

## 2023-12-27 PROCEDURE — 250N000009 HC RX 250: Performed by: OTOLARYNGOLOGY

## 2023-12-27 PROCEDURE — 710N000012 HC RECOVERY PHASE 2, PER MINUTE: Performed by: OTOLARYNGOLOGY

## 2023-12-27 PROCEDURE — 88331 PATH CONSLTJ SURG 1 BLK 1SPC: CPT | Mod: 26 | Performed by: PATHOLOGY

## 2023-12-27 PROCEDURE — 11422 EXC H-F-NK-SP B9+MARG 1.1-2: CPT | Performed by: OTOLARYNGOLOGY

## 2023-12-27 PROCEDURE — 250N000009 HC RX 250: Performed by: NURSE ANESTHETIST, CERTIFIED REGISTERED

## 2023-12-27 PROCEDURE — 250N000011 HC RX IP 250 OP 636: Performed by: NURSE ANESTHETIST, CERTIFIED REGISTERED

## 2023-12-27 PROCEDURE — 272N000001 HC OR GENERAL SUPPLY STERILE: Performed by: OTOLARYNGOLOGY

## 2023-12-27 PROCEDURE — 81025 URINE PREGNANCY TEST: CPT

## 2023-12-27 PROCEDURE — 88305 TISSUE EXAM BY PATHOLOGIST: CPT | Mod: 26 | Performed by: PATHOLOGY

## 2023-12-27 PROCEDURE — 88332 PATH CONSLTJ SURG EA ADD BLK: CPT | Mod: 26 | Performed by: PATHOLOGY

## 2023-12-27 PROCEDURE — 11422 EXC H-F-NK-SP B9+MARG 1.1-2: CPT | Performed by: NURSE ANESTHETIST, CERTIFIED REGISTERED

## 2023-12-27 PROCEDURE — 88331 PATH CONSLTJ SURG 1 BLK 1SPC: CPT | Mod: TC | Performed by: OTOLARYNGOLOGY

## 2023-12-27 PROCEDURE — 370N000017 HC ANESTHESIA TECHNICAL FEE, PER MIN: Performed by: OTOLARYNGOLOGY

## 2023-12-27 RX ORDER — EPINEPHRINE 1 MG/ML
INJECTION, SOLUTION INTRAMUSCULAR; SUBCUTANEOUS
Status: DISCONTINUED
Start: 2023-12-27 | End: 2023-12-27 | Stop reason: WASHOUT

## 2023-12-27 RX ORDER — LIDOCAINE HYDROCHLORIDE 20 MG/ML
INJECTION, SOLUTION INFILTRATION; PERINEURAL PRN
Status: DISCONTINUED | OUTPATIENT
Start: 2023-12-27 | End: 2023-12-27

## 2023-12-27 RX ORDER — FENTANYL CITRATE 50 UG/ML
50 INJECTION, SOLUTION INTRAMUSCULAR; INTRAVENOUS EVERY 5 MIN PRN
Status: DISCONTINUED | OUTPATIENT
Start: 2023-12-27 | End: 2023-12-27 | Stop reason: HOSPADM

## 2023-12-27 RX ORDER — HYDRALAZINE HYDROCHLORIDE 20 MG/ML
2.5-5 INJECTION INTRAMUSCULAR; INTRAVENOUS EVERY 10 MIN PRN
Status: DISCONTINUED | OUTPATIENT
Start: 2023-12-27 | End: 2023-12-27 | Stop reason: HOSPADM

## 2023-12-27 RX ORDER — GINSENG 100 MG
CAPSULE ORAL
Status: DISCONTINUED
Start: 2023-12-27 | End: 2023-12-27 | Stop reason: HOSPADM

## 2023-12-27 RX ORDER — LIDOCAINE 40 MG/G
CREAM TOPICAL
Status: DISCONTINUED | OUTPATIENT
Start: 2023-12-27 | End: 2023-12-27 | Stop reason: HOSPADM

## 2023-12-27 RX ORDER — NALOXONE HYDROCHLORIDE 0.4 MG/ML
0.4 INJECTION, SOLUTION INTRAMUSCULAR; INTRAVENOUS; SUBCUTANEOUS
Status: DISCONTINUED | OUTPATIENT
Start: 2023-12-27 | End: 2023-12-27 | Stop reason: HOSPADM

## 2023-12-27 RX ORDER — DEXMEDETOMIDINE HYDROCHLORIDE 4 UG/ML
INJECTION, SOLUTION INTRAVENOUS PRN
Status: DISCONTINUED | OUTPATIENT
Start: 2023-12-27 | End: 2023-12-27

## 2023-12-27 RX ORDER — LIDOCAINE HYDROCHLORIDE AND EPINEPHRINE 10; 10 MG/ML; UG/ML
INJECTION, SOLUTION INFILTRATION; PERINEURAL PRN
Status: DISCONTINUED | OUTPATIENT
Start: 2023-12-27 | End: 2023-12-27 | Stop reason: HOSPADM

## 2023-12-27 RX ORDER — LIDOCAINE HYDROCHLORIDE AND EPINEPHRINE 10; 10 MG/ML; UG/ML
INJECTION, SOLUTION INFILTRATION; PERINEURAL
Status: DISCONTINUED
Start: 2023-12-27 | End: 2023-12-27 | Stop reason: HOSPADM

## 2023-12-27 RX ORDER — SODIUM CHLORIDE, SODIUM LACTATE, POTASSIUM CHLORIDE, CALCIUM CHLORIDE 600; 310; 30; 20 MG/100ML; MG/100ML; MG/100ML; MG/100ML
INJECTION, SOLUTION INTRAVENOUS CONTINUOUS
Status: DISCONTINUED | OUTPATIENT
Start: 2023-12-27 | End: 2023-12-27 | Stop reason: HOSPADM

## 2023-12-27 RX ORDER — PROPOFOL 10 MG/ML
INJECTION, EMULSION INTRAVENOUS PRN
Status: DISCONTINUED | OUTPATIENT
Start: 2023-12-27 | End: 2023-12-27

## 2023-12-27 RX ORDER — HYDROMORPHONE HYDROCHLORIDE 1 MG/ML
0.5 INJECTION, SOLUTION INTRAMUSCULAR; INTRAVENOUS; SUBCUTANEOUS EVERY 5 MIN PRN
Status: DISCONTINUED | OUTPATIENT
Start: 2023-12-27 | End: 2023-12-27 | Stop reason: HOSPADM

## 2023-12-27 RX ORDER — PROPOFOL 10 MG/ML
INJECTION, EMULSION INTRAVENOUS CONTINUOUS PRN
Status: DISCONTINUED | OUTPATIENT
Start: 2023-12-27 | End: 2023-12-27

## 2023-12-27 RX ORDER — ONDANSETRON 2 MG/ML
4 INJECTION INTRAMUSCULAR; INTRAVENOUS EVERY 30 MIN PRN
Status: DISCONTINUED | OUTPATIENT
Start: 2023-12-27 | End: 2023-12-27 | Stop reason: HOSPADM

## 2023-12-27 RX ORDER — LABETALOL 20 MG/4 ML (5 MG/ML) INTRAVENOUS SYRINGE
10
Status: DISCONTINUED | OUTPATIENT
Start: 2023-12-27 | End: 2023-12-27 | Stop reason: HOSPADM

## 2023-12-27 RX ORDER — ONDANSETRON 4 MG/1
4 TABLET, ORALLY DISINTEGRATING ORAL EVERY 30 MIN PRN
Status: DISCONTINUED | OUTPATIENT
Start: 2023-12-27 | End: 2023-12-27 | Stop reason: HOSPADM

## 2023-12-27 RX ORDER — NALOXONE HYDROCHLORIDE 0.4 MG/ML
0.2 INJECTION, SOLUTION INTRAMUSCULAR; INTRAVENOUS; SUBCUTANEOUS
Status: DISCONTINUED | OUTPATIENT
Start: 2023-12-27 | End: 2023-12-27 | Stop reason: HOSPADM

## 2023-12-27 RX ORDER — BACITRACIN ZINC 500 [USP'U]/G
OINTMENT TOPICAL PRN
Status: DISCONTINUED | OUTPATIENT
Start: 2023-12-27 | End: 2023-12-27 | Stop reason: HOSPADM

## 2023-12-27 RX ADMIN — LIDOCAINE HYDROCHLORIDE 40 MG: 20 INJECTION, SOLUTION INFILTRATION; PERINEURAL at 10:34

## 2023-12-27 RX ADMIN — SODIUM CHLORIDE, POTASSIUM CHLORIDE, SODIUM LACTATE AND CALCIUM CHLORIDE: 600; 310; 30; 20 INJECTION, SOLUTION INTRAVENOUS at 11:26

## 2023-12-27 RX ADMIN — PROPOFOL 50 MG: 10 INJECTION, EMULSION INTRAVENOUS at 10:41

## 2023-12-27 RX ADMIN — DEXMEDETOMIDINE 12 MCG: 100 INJECTION, SOLUTION, CONCENTRATE INTRAVENOUS at 10:32

## 2023-12-27 RX ADMIN — PROPOFOL 10 MG: 10 INJECTION, EMULSION INTRAVENOUS at 11:15

## 2023-12-27 RX ADMIN — PROPOFOL 100 MCG/KG/MIN: 10 INJECTION, EMULSION INTRAVENOUS at 10:36

## 2023-12-27 RX ADMIN — DEXMEDETOMIDINE 8 MCG: 100 INJECTION, SOLUTION, CONCENTRATE INTRAVENOUS at 10:44

## 2023-12-27 RX ADMIN — SODIUM CHLORIDE, POTASSIUM CHLORIDE, SODIUM LACTATE AND CALCIUM CHLORIDE: 600; 310; 30; 20 INJECTION, SOLUTION INTRAVENOUS at 10:04

## 2023-12-27 RX ADMIN — PROPOFOL 50 MG: 10 INJECTION, EMULSION INTRAVENOUS at 10:36

## 2023-12-27 RX ADMIN — PROPOFOL 10 MG: 10 INJECTION, EMULSION INTRAVENOUS at 11:22

## 2023-12-27 RX ADMIN — PROPOFOL 150 MCG/KG/MIN: 10 INJECTION, EMULSION INTRAVENOUS at 10:40

## 2023-12-27 RX ADMIN — PROPOFOL 10 MG: 10 INJECTION, EMULSION INTRAVENOUS at 11:07

## 2023-12-27 RX ADMIN — MIDAZOLAM 2 MG: 1 INJECTION INTRAMUSCULAR; INTRAVENOUS at 10:31

## 2023-12-27 ASSESSMENT — ACTIVITIES OF DAILY LIVING (ADL)
ADLS_ACUITY_SCORE: 18
ADLS_ACUITY_SCORE: 18

## 2023-12-27 NOTE — ANESTHESIA POSTPROCEDURE EVALUATION
Patient: Opal Henderson    Procedure: Procedure(s):  EXCISION LEFT CHEEK LESION, with incision measuring 2 cm, FROZENS       Anesthesia Type:  MAC    Note:  Disposition: Outpatient   Postop Pain Control: Uneventful            Sign Out: Well controlled pain   PONV: No   Neuro/Psych: Uneventful            Sign Out: Acceptable/Baseline neuro status   Airway/Respiratory: Uneventful            Sign Out: Acceptable/Baseline resp. status   CV/Hemodynamics: Uneventful            Sign Out: Acceptable CV status; No obvious hypovolemia; No obvious fluid overload   Other NRE: NONE   DID A NON-ROUTINE EVENT OCCUR? No       Last vitals:  Vitals Value Taken Time   /74 12/27/23 1145   Temp 97.1  F (36.2  C) 12/27/23 1135   Pulse 69 12/27/23 1145   Resp 16 12/27/23 1145   SpO2 94 % 12/27/23 1151   Vitals shown include unfiled device data.    Electronically Signed By: DIMPLE Downing CRNA  December 27, 2023  11:52 AM

## 2023-12-27 NOTE — OP NOTE
PREOPERATIVE DIAGNOSES:   1.  Left cheek nevus sebaceous  POSTOPERATIVE DIAGNOSES:   1.  Same  PROCEDURE PERFORMED: Excision left cheek nevus sebaceous with incision measuring 2 cm  SURGEON: Mercy Goldberg D.O.  BLOOD LOSS: 1 ml  COMPLICATIONS: None.   SPECIMENS: None.   FINDINGS: Sebaceous lesion per frozen section no evidence of malignancy margins grossly clear  ANESTHESIA: MAC with local    OPERATIVE PROCEDURE: After surgical consent was obtained, the patient was taken to the operating room and laid in a comfortable supine position.  The patient was then administered monitored anesthesia.  She was draped and prepped in the normal sterile fashion and a timeout was taken.  I then demarcated the lesion at  the left mid cheek with a 2 mm cuff of normal appearing skin and infiltrated the skin with 1% lidocaine with 1:100,000 epinephrine.  I then used a 15-blade to create an  elliptical incision around the lesion parallel up with relaxed skin tension lines.  I then elevated the skin ellipse and a thin cuff of underlying subdermal fat with the scalpel.  After the lesion was completely removed, it was sent to pathology for frozen section analysis.  There is no evidence of carcinoma and margins are grossly clear..  Hemostasis was achieved with direct pressure and hand held cautery.  The incision measured 2.0 cm.  I placed double skin hooks and widely undermined in the subcutaneous plane.  Hemostasis was achieved with bipolar cautery the wound was irrigated and gently suctioned.  I then proceeded to close the incision by using simple interrupted buried sutures, using 4 and 5-0 Vicryl.  The skin edges were then reapproximated using 5-0 nylon, simple interrupted sutures.  Antibiotic ointment was then applied and the wound was dressed.  The patient tolerated the procedure without any difficulty.

## 2023-12-27 NOTE — ANESTHESIA CARE TRANSFER NOTE
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    Anesthesia Type:   MAC     Note:    Oropharynx: oropharynx clear of all foreign objects and spontaneously breathing  Level of Consciousness: awake  Oxygen Supplementation: room air    Independent Airway: airway patency satisfactory and stable  Dentition: dentition unchanged  Vital Signs Stable: post-procedure vital signs reviewed and stable    Patient transferred to: Phase II    Handoff Report: Identifed the Patient, Identified the Reponsible Provider, Reviewed the pertinent medical history, Discussed the surgical course, Reviewed Intra-OP anesthesia mangement and issues during anesthesia, Set expectations for post-procedure period and Allowed opportunity for questions and acknowledgement of understanding  Vitals:  Vitals Value Taken Time   /64 12/27/23 1134   Temp     Pulse 75 12/27/23 1135   Resp     SpO2 93 % 12/27/23 1136   Vitals shown include unfiled device data.    Electronically Signed By: DIMPLE Loera CRNA  December 27, 2023  11:38 AM

## 2023-12-27 NOTE — DISCHARGE INSTRUCTIONS
POST OPERATIVE WOUND CARE INSTRUCTIONS    Take tylenol alternated with ibuprofen as needed for pain.  If an antibiotic was given to you please complete this.    Gently cleanse wound if not covered with soap and water 3 times daily and then apply bacitracin or bactroban ointment.  This can be purchased over the counter.    If your wound is covered, leave the dressing on for 2-3 days.  You may then remove dressing and start cleaning the wound.     No soaking of the wound for 2 weeks, you may shower normally.  Try to keep any dressing dry.     Avoid excess movement around the incision site and do not rub the area.      Follow up in 1 week with ENT RN.   Call [phone] if you need an appointment.    For any heavy bleeding or excessive swelling  please contact our office at [phone]  After Anesthesia (Sleep Medicine)  What should I do after anesthesia?  You should rest and relax for the next 24 hours. Avoid risky or difficult (strenuous) activity. A responsible adult should stay with you overnight.  Don't drive or use any heavy equipment for 24 hours. Even if you feel normal, your reactions may be affected by the sleep medicine given to you.  Don't drink alcohol or make any important decisions for 24 hours.  Slowly get back to your regular diet, as you feel able.  How should I expect to feel?  It's normal to feel dizzy, light-headed, or faint for up to a full day after anesthesia or while taking pain medicine. If this happens:   Sit down for a few minutes before standing.  Have someone help you when you get up to walk or use the bathroom.  If you have nausea (feel sick to your stomach) or vomit (throw up):   Drink clear liquids (such as apple juice, ginger ale, broth, or 7UP) until you feel better.  If you feel sick to your stomach, or you keep vomiting for 24 hours, please call the doctor.  What else should I know?  You might have a dry mouth, sore throat, muscle aches, or trouble sleeping. These should go away after  24 hours.  Please contact your doctor if you have any other symptoms that concern you, such as fever, pain, bleeding, fluid drainage, swelling, or headache, or if it's been over 8 to 10 hours and you still aren't able to pee (urinate).  If you have a history of sleep apnea, it's very important to use your CPAP machine for the next 24 hours when you nap or sleep.   For informational purposes only. Not to replace the advice of your health care provider. Copyright   2023 [facility]. All rights reserved. Clinically reviewed by Fredy Izquierdo MD. [employer] [id number] - REV 09/23.

## 2023-12-28 LAB
PATH REPORT.COMMENTS IMP SPEC: NORMAL
PATH REPORT.COMMENTS IMP SPEC: NORMAL
PATH REPORT.FINAL DX SPEC: NORMAL
PATH REPORT.GROSS SPEC: NORMAL
PATH REPORT.INTRAOP OBS SPEC DOC: NORMAL
PATH REPORT.MICROSCOPIC SPEC OTHER STN: NORMAL
PATH REPORT.RELEVANT HX SPEC: NORMAL
PHOTO IMAGE: NORMAL

## 2024-01-03 ENCOUNTER — ALLIED HEALTH/NURSE VISIT (OUTPATIENT)
Dept: OTOLARYNGOLOGY | Facility: OTHER | Age: 39
End: 2024-01-03
Attending: PHYSICIAN ASSISTANT
Payer: COMMERCIAL

## 2024-01-03 DIAGNOSIS — L98.9 SKIN LESION OF CHEEK: Primary | ICD-10-CM

## 2024-01-03 NOTE — PATIENT INSTRUCTIONS
POST PROCEDURE INSTRUCTIONS    Leave steri strip dressing on until they fall off  You can shower but don't soak.  Wash incision with Gentle Cleanser Twice Daily (Cetaphil, Baby Shampoo, etc)  Apply Bacitracin Ointment Three Times Daily; After 1 week, use Aquaphor Ointment   Keep incision clean and dry   Do NOT soak in water such as a tub bath or swimming   Do NOT put make-up, powders, hairspray, lotions, etc on the incision    SIGNS OF INFECTION ARE:  Redness, swelling, red streaks, pus, drainage, warmth, fever, increased pain, foul smell.   Contact your primary health care provider if you notice any of the warning signs.         6 WEEKS POST PROCEDURE    Apply ANY type of lotion to the suture site(Example - Vaseline Intensive Care or Vitamin E)  Massage the surgical area 1-2 times daily in a circular motion for 5 minutes, for a period of 2 months. This will help the scar heal better.

## 2024-01-03 NOTE — NURSING NOTE
Site assessed by this nurse prior to suture removal. Site clean, dry, intact. No signs or symptoms of infection.

## 2024-01-03 NOTE — NURSING NOTE
Patient presented for suture removal.  RN checked site.  Removed 7 sutures from left cheek, placed 1/4 inch steri strips.

## 2024-01-16 ENCOUNTER — TELEPHONE (OUTPATIENT)
Dept: CARE COORDINATION | Facility: OTHER | Age: 39
End: 2024-01-16

## 2024-01-16 NOTE — TELEPHONE ENCOUNTER
"RN CC received message from scheduling from patient regarding ear ache, fullness.  Rn CC called patient and she states she had a cold which resolved however now has ear ache, fullness which causing her not to be able to hear well.  States she is also having dizziness.  States no fever or other symptoms.  Rates ear pain at a 2 and states \"I can live with it.\" States she was taking sudafed but nothing seems to be helping it.   RN CC talked with PCP and to see covering provider as PCP out tomorrow. RN CC talked with [facility] and he states schedule 4 pm Friday and patient can come anytime. Called patient and she states can be here 4:15 pm.  HE[facility] requests patient come earlier.  RN CC called patient back and she requests coming at 8 am.  Patient scheduled for 4 pm and note placed that she will be coming at 8 am.  Lily Young RN  Care Coordination  "

## 2024-01-17 ENCOUNTER — OFFICE VISIT (OUTPATIENT)
Dept: FAMILY MEDICINE | Facility: OTHER | Age: 39
End: 2024-01-17
Attending: NURSE PRACTITIONER
Payer: COMMERCIAL

## 2024-01-17 VITALS
TEMPERATURE: 98.4 F | BODY MASS INDEX: 33.48 KG/M2 | RESPIRATION RATE: 16 BRPM | DIASTOLIC BLOOD PRESSURE: 76 MMHG | WEIGHT: 166.1 LBS | HEART RATE: 70 BPM | HEIGHT: 59 IN | SYSTOLIC BLOOD PRESSURE: 124 MMHG | OXYGEN SATURATION: 95 %

## 2024-01-17 DIAGNOSIS — H66.002 ACUTE SUPPURATIVE OTITIS MEDIA OF LEFT EAR WITHOUT SPONTANEOUS RUPTURE OF TYMPANIC MEMBRANE, RECURRENCE NOT SPECIFIED: Primary | ICD-10-CM

## 2024-01-17 PROCEDURE — 99213 OFFICE O/P EST LOW 20 MIN: CPT | Performed by: NURSE PRACTITIONER

## 2024-01-17 RX ORDER — PREDNISONE 20 MG/1
20 TABLET ORAL 2 TIMES DAILY
Qty: 10 TABLET | Refills: 0 | Status: SHIPPED | OUTPATIENT
Start: 2024-01-17 | End: 2024-01-22

## 2024-01-17 RX ORDER — AZITHROMYCIN 250 MG/1
TABLET, FILM COATED ORAL
Qty: 6 TABLET | Refills: 0 | Status: SHIPPED | OUTPATIENT
Start: 2024-01-17 | End: 2024-01-22

## 2024-01-17 ASSESSMENT — PAIN SCALES - GENERAL: PAINLEVEL: NO PAIN (0)

## 2024-01-17 NOTE — PROGRESS NOTES
"  Assessment & Plan     Acute suppurative otitis media of left ear without spontaneous rupture of tympanic membrane, recurrence not specified  Pressure and discomfort into left ear.  Thick mucopurulent noted behind left TM.  Discussed treatment with antibiotic and steroid.  She has had fluid behind the left TM since cold symptoms a month ago  - azithromycin (ZITHROMAX) 250 MG tablet; Take 2 tablets (500 mg) by mouth daily for 1 day, THEN 1 tablet (250 mg) daily for 4 days.  - predniSONE (DELTASONE) 20 MG tablet; Take 1 tablet (20 mg) by mouth 2 times daily for 5 days    We discussed medication and side effects Such as, but not limited to increased energy, difficulty sleeping and increase appetite       BMI  Estimated body mass index is 33.55 kg/m  as calculated from the following:    Height as of this encounter: 1.499 m (4' 11\").    Weight as of this encounter: 75.3 kg (166 lb 1.6 oz).       See Patient Instructions    No follow-ups on file.    Pedrito Joseph is a 38 year old, presenting for the following health issues:  Ear Problem        1/17/2024     8:08 AM   Additional Questions   Roomed by Isael Wilson LPN   Accompanied by Self         1/17/2024     8:08 AM   Patient Reported Additional Medications   Patient reports taking the following new medications None     HPI     Concern - check ears  Onset: Beginning of December  Description: Was ill, ears were plugging up, didn't get any better,  Intensity: mild  Progression of Symptoms:  same and constant  Accompanying Signs & Symptoms: Ears feel full, plugged with occasional pain and dizziness  Previous history of similar problem: yes  Precipitating factors:        Worsened by: None  Alleviating factors:        Improved by: Treatment/Antibiotics  Therapies tried and outcome: Sudafed, nasal spray, ear wax drops, has not helped -stopped no relief           Objective    /76   Pulse 70   Temp 98.4  F (36.9  C) (Tympanic)   Resp 16   Ht 1.499 m (4' 11\") "   Wt 75.3 kg (166 lb 1.6 oz)   SpO2 95%   BMI 33.55 kg/m    Body mass index is 33.55 kg/m .    Review of Systems  Constitutional, HEENT, cardiovascular, pulmonary, GI, , musculoskeletal, neuro, skin, endocrine and psych systems are negative, except as otherwise noted.  Physical Exam   GENERAL: alert and no distress  HENT: normal cephalic/atraumatic, right ear: normal: no effusions, no erythema, normal landmarks, left ear: mucopurulent effusion, nose and mouth without ulcers or lesions, oropharynx clear, and oral mucous membranes moist  NECK: no adenopathy, no asymmetry, masses, or scars  RESP: lungs clear to auscultation - no rales, rhonchi or wheezes  CV: regular rate and rhythm, normal S1 S2, no S3 or S4, no murmur, click or rub, no peripheral edema  ABDOMEN: soft, nontender, no hepatosplenomegaly, no masses and bowel sounds normal    Reviewed previous labs in [facility]        Signed Electronically by: DIMPLE Simms CNP

## 2024-02-18 ENCOUNTER — HEALTH MAINTENANCE LETTER (OUTPATIENT)
Age: 39
End: 2024-02-18

## 2024-03-01 ENCOUNTER — TELEPHONE (OUTPATIENT)
Dept: FAMILY MEDICINE | Facility: OTHER | Age: 39
End: 2024-03-01

## 2024-03-01 NOTE — TELEPHONE ENCOUNTER
Please let pt know that Dr. Mohr is out today. Her schedule is full at 8 AM on Monday.     She can wait for Dr. Mohr to reply to request on Monday or she can schedule with covering.

## 2024-03-01 NOTE — TELEPHONE ENCOUNTER
Attempt # 1  Outcome: Left Message   Comment: left VM with patient letting her know that she can call back and schedule appt with a covering provider or wait until Dr. Mohr is back on Monday and we hear from Dr. Mohr on if she is able to work this patient in. I provided the patient with the scheduling number if she wishes to schedule with a covering provider.

## 2024-03-01 NOTE — TELEPHONE ENCOUNTER
8:36 AM    Reason for Call: OVERBOOK    Patient is having the following symptoms: Patient had an ear infection in December 2023 and she is still having problems having ring, dizzy and a hard time hearing.    The patient is requesting an appointment for 8:00am on Monday 8/4/24 with Dr Mohr.    Was an appointment offered for this call? No  If yes : Appointment type              Date    Preferred method for responding to this message: Telephone Call  What is your phone number ? [phone]    If we cannot reach you directly, may we leave a detailed response at the number you provided? Yes    Can this message wait until your PCP/provider returns, if unavailable today? NO

## 2024-03-04 NOTE — CONFIDENTIAL NOTE
Patient scheduled with covering provider, per Dr. Mohr. She is scheduled to see Dr. Arrieta on 3/5.

## 2024-03-05 ENCOUNTER — OFFICE VISIT (OUTPATIENT)
Dept: FAMILY MEDICINE | Facility: OTHER | Age: 39
End: 2024-03-05
Attending: FAMILY MEDICINE
Payer: COMMERCIAL

## 2024-03-05 VITALS
DIASTOLIC BLOOD PRESSURE: 74 MMHG | SYSTOLIC BLOOD PRESSURE: 113 MMHG | OXYGEN SATURATION: 97 % | WEIGHT: 164.7 LBS | BODY MASS INDEX: 33.2 KG/M2 | HEIGHT: 59 IN | RESPIRATION RATE: 16 BRPM | HEART RATE: 57 BPM | TEMPERATURE: 97.9 F

## 2024-03-05 DIAGNOSIS — H93.13 TINNITUS, BILATERAL: ICD-10-CM

## 2024-03-05 DIAGNOSIS — H66.90 SUBACUTE OTITIS MEDIA, UNSPECIFIED OTITIS MEDIA TYPE: Primary | ICD-10-CM

## 2024-03-05 DIAGNOSIS — H91.93 DECREASED HEARING OF BOTH EARS: ICD-10-CM

## 2024-03-05 PROCEDURE — 99213 OFFICE O/P EST LOW 20 MIN: CPT | Performed by: FAMILY MEDICINE

## 2024-03-05 NOTE — PROGRESS NOTES
"  Assessment & Plan     Subacute otitis media, unspecified otitis media type  - amoxicillin-clavulanate (AUGMENTIN) 875-125 MG tablet; Take 1 tablet by mouth 2 times daily for 10 days  - Adult ENT  Referral; Future    Tinnitus, bilateral  - Adult ENT  Referral; Future    Decreased hearing of both ears  - Adult ENT  Referral; Future        Subjective   Opal is a 38 year old, presenting for the following health issues:  Dizziness        3/5/2024     3:39 PM   Additional Questions   Roomed by Karina Dumont   Accompanied by none         3/5/2024     3:39 PM   Patient Reported Additional Medications   Patient reports taking the following new medications none     HPI     Dizziness  Onset/Duration: December  Description:   Do you feel faint: No  Does it feel like the surroundings (bed, room) are moving: No  Unsteady/off balance: YES  Have you passed out or fallen: No  Intensity: mild  Progression of Symptoms: same and intermittent  Accompanying Signs & Symptoms:  Heart palpitations or chest pain: No  Nausea, vomiting: No  Weakness or lack of coordination in arms or legs: No  Vision or speech changes: No  Numbness or tingling: No  Ringing in ears (Tinnitus): YES  Hearing Loss: YES  History:   Head trauma/concussion history: No  Previous similar symptoms: No  Recent bleeding history: No  Any new medications (BP?): No  Precipitating factors:   Worse with activity: No  Worse with head movement: YES  Alleviating factors:   Does staying in a fixed position give relief: no   Therapies tried and outcome: azithromycin and prednisone          Objective    /74 (BP Location: Left arm, Patient Position: Sitting, Cuff Size: Adult Regular)   Pulse 57   Temp 97.9  F (36.6  C) (Tympanic)   Resp 16   Ht 1.499 m (4' 11\")   Wt 74.7 kg (164 lb 11.2 oz)   LMP  (LMP Unknown)   SpO2 97%   BMI 33.27 kg/m    Body mass index is 33.27 kg/m .  Physical Exam  Constitutional:       General: She is not in acute " distress.     Appearance: Normal appearance.   HENT:      Head: Normocephalic and atraumatic.      Right Ear: Tympanic membrane normal.      Ears:      Comments: Left TM opaque     Nose: Nose normal.      Mouth/Throat:      Mouth: Mucous membranes are moist.      Pharynx: Oropharynx is clear.   Eyes:      Conjunctiva/sclera: Conjunctivae normal.      Pupils: Pupils are equal, round, and reactive to light.   Cardiovascular:      Rate and Rhythm: Normal rate and regular rhythm.      Heart sounds: Normal heart sounds. No murmur heard.  Pulmonary:      Effort: Pulmonary effort is normal.      Breath sounds: Normal breath sounds.   Lymphadenopathy:      Cervical: No cervical adenopathy.   Neurological:      General: No focal deficit present.      Mental Status: She is alert and oriented to person, place, and time.                  Signed Electronically by: ANKUSH MENSAH DO

## 2024-03-06 DIAGNOSIS — H91.90 DECREASED HEARING: Primary | ICD-10-CM

## 2024-04-18 ENCOUNTER — TELEPHONE (OUTPATIENT)
Dept: FAMILY MEDICINE | Facility: OTHER | Age: 39
End: 2024-04-18

## 2024-04-18 ENCOUNTER — OFFICE VISIT (OUTPATIENT)
Dept: FAMILY MEDICINE | Facility: OTHER | Age: 39
End: 2024-04-18
Attending: FAMILY MEDICINE
Payer: COMMERCIAL

## 2024-04-18 VITALS
OXYGEN SATURATION: 96 % | TEMPERATURE: 97.1 F | BODY MASS INDEX: 33.55 KG/M2 | DIASTOLIC BLOOD PRESSURE: 84 MMHG | WEIGHT: 166.1 LBS | SYSTOLIC BLOOD PRESSURE: 128 MMHG | HEART RATE: 68 BPM

## 2024-04-18 DIAGNOSIS — Z91.09 ENVIRONMENTAL ALLERGIES: ICD-10-CM

## 2024-04-18 DIAGNOSIS — J30.81 CAT ALLERGIES: ICD-10-CM

## 2024-04-18 DIAGNOSIS — H69.92 DYSFUNCTION OF LEFT EUSTACHIAN TUBE: Primary | ICD-10-CM

## 2024-04-18 PROCEDURE — 99213 OFFICE O/P EST LOW 20 MIN: CPT | Performed by: FAMILY MEDICINE

## 2024-04-18 RX ORDER — PREDNISONE 20 MG/1
40 TABLET ORAL DAILY
Qty: 10 TABLET | Refills: 0 | Status: SHIPPED | OUTPATIENT
Start: 2024-04-18 | End: 2024-04-23

## 2024-04-18 RX ORDER — FLUTICASONE PROPIONATE 50 MCG
1 SPRAY, SUSPENSION (ML) NASAL DAILY
Qty: 16 G | Refills: 1 | Status: ON HOLD | OUTPATIENT
Start: 2024-04-18 | End: 2024-09-24

## 2024-04-18 ASSESSMENT — PAIN SCALES - GENERAL: PAINLEVEL: NO PAIN (0)

## 2024-04-18 NOTE — PATIENT INSTRUCTIONS
Continue Zyrtec nightly.  Add Flonase nasal spray nightly as well.  Continue both until your ENT consult next month.  Complete short course oral prednisone 40 mg x 5 days - to help with swelling/inflammation.  Sinus rinses advised - distilled water - Neilmed kit - can get at any pharmacy.  Do it twice daily.  Limit allergen exposure - no cat in bedroom; hepa filter; etc.

## 2024-04-18 NOTE — TELEPHONE ENCOUNTER
9:53 AM    Reason for Call: OVERBOOK    Patient is having the following symptoms: ear infection for 2 days.    The patient is requesting an appointment for today with Onur coburn to see another available provider.    Was an appointment offered for this call? No  If yes : Appointment type              Date    Preferred method for responding to this message: Telephone Call  What is your phone number? [phone]    If we cannot reach you directly, may we leave a detailed response at the number you provided? Yes    Can this message wait until your PCP/provider returns, if unavailable today? Not applicable    Lucinda Bashir

## 2024-04-18 NOTE — PROGRESS NOTES
"  Assessment & Plan     Dysfunction of left eustachian tube  Related to allergies most likely.  No purulence.  No fever.  No typical URI prodrome.  May still be viral as well.  See below.  - fluticasone (FLONASE) 50 MCG/ACT nasal spray; Spray 1 spray into both nostrils daily  - predniSONE (DELTASONE) 20 MG tablet; Take 2 tablets (40 mg) by mouth daily for 5 days    Environmental allergies  Counseled on allergen avoidance.  Continue antihistamine - added only a couple days ago.  Add nasal steroid spray - discussed consistency, time to take effect.  Add sinus rinses.  Short course prednisone burst for symptom control.   ENT consult as scheduled.  - fluticasone (FLONASE) 50 MCG/ACT nasal spray; Spray 1 spray into both nostrils daily  - predniSONE (DELTASONE) 20 MG tablet; Take 2 tablets (40 mg) by mouth daily for 5 days    Cat allergies  See above - counseling performed on allergen avoidance/mitigation.      BMI  Estimated body mass index is 33.55 kg/m  as calculated from the following:    Height as of 3/5/24: 1.499 m (4' 11\").    Weight as of this encounter: 75.3 kg (166 lb 1.6 oz).         See Patient Instructions    No follow-ups on file.    Pedrito Joseph is a 38 year old, presenting for the following health issues:  Otitis Media        4/18/2024     1:58 PM   Additional Questions   Roomed by Jayson Wong   Accompanied by None         4/18/2024     1:58 PM   Patient Reported Additional Medications   Patient reports taking the following new medications None     History of Present Illness       Reason for visit:  Ear infection    She eats 2-3 servings of fruits and vegetables daily.She consumes 0 sweetened beverage(s) daily.She exercises with enough effort to increase her heart rate 9 or less minutes per day.  She exercises with enough effort to increase her heart rate 3 or less days per week.   She is taking medications regularly.       Acute Illness  Acute illness concerns: Ear infection (Left " Ear)  Onset/Duration: 4/16/2023  Symptoms:  Fever: No  Chills/Sweats: No  Headache (location?): No  Sinus Pressure: YES  Conjunctivitis:  No  Ear Pain: left - no drainge  Rhinorrhea: No  Congestion: YES  Sore Throat: YES  Cough: no  Wheeze: No  Decreased Appetite: No  Nausea: No  Vomiting: No  Diarrhea: No  Dysuria/Freq.: No  Dysuria or Hematuria: No  Fatigue/Achiness: No  Sick/Strep Exposure: No  Therapies tried and outcome: Ibuprofen and zyrtec ; did take today  No recent nasal spray.  Prior PE tubes as kid.    Unvaccinated flu/covid.  PCP Dr Mohr.  Augmentin 3/5/24 - for otitis.  Referred to ENT at that time.  Scheduled for end of May.    Just got 3rd cat.  Past month.  Sleeps with them.  Known allergies.    Review of Systems  Constitutional, HEENT, cardiovascular, pulmonary, gi and gu systems are negative, except as otherwise noted.      Objective    /84 (BP Location: Right arm, Patient Position: Sitting, Cuff Size: Adult Regular)   Pulse 68   Temp 97.1  F (36.2  C) (Tympanic)   Wt 75.3 kg (166 lb 1.6 oz)   LMP 04/11/2024 (Within Days)   SpO2 96%   BMI 33.55 kg/m    Body mass index is 33.55 kg/m .  Physical Exam   GENERAL: alert and no distress  EYES: Eyes grossly normal to inspection, PERRL and conjunctivae and sclerae normal  HENT: normal cephalic/atraumatic, both ears: clear effusion, nasal mucosa edematous , oropharynx clear, and oral mucous membranes moist  NECK: no adenopathy, no asymmetry, masses, or scars  RESP: lungs clear to auscultation - no rales, rhonchi or wheezes  CV: regular rate and rhythm, normal S1 S2, no S3 or S4, no murmur, click or rub, no peripheral edema  PSYCH: mentation appears normal, affect normal/bright            Signed Electronically by: Alysha Cleray MD

## 2024-05-04 NOTE — PATIENT INSTRUCTIONS
We will call you with your lab results.      Preventive Care Advice   This is general advice given by our system to help you stay healthy. However, your care team may have specific advice just for you. Please talk to your care team about your preventive care needs.  Nutrition  Eat 5 or more servings of fruits and vegetables each day.  Try wheat bread, brown rice and whole grain pasta (instead of white bread, rice, and pasta).  Get enough calcium and vitamin D. Check the label on foods and aim for 100% of the RDA (recommended daily allowance).  Lifestyle  Exercise at least 150 minutes each week   (30 minutes a day, 5 days a week).  Do muscle strengthening activities 2 days a week. These help control your weight and prevent disease.  No smoking.  Wear sunscreen to prevent skin cancer.  Have a dental exam and cleaning every 6 months.  Yearly exams  See your health care team every year to talk about:  Any changes in your health.  Any medicines your care team has prescribed.  Preventive care, family planning, and ways to prevent chronic diseases.  Shots (vaccines)   HPV shots (up to age 26), if you've never had them before.  Hepatitis B shots (up to age 59), if you've never had them before.  COVID-19 shot: Get this shot when it's due.  Flu shot: Get a flu shot every year.  Tetanus shot: Get a tetanus shot every 10 years.  Pneumococcal, hepatitis A, and RSV shots: Ask your care team if you need these based on your risk.  Shingles shot (for age 50 and up).  General health tests  Diabetes screening:  Starting at age 35, Get screened for diabetes at least every 3 years.  If you are younger than age 35, ask your care team if you should be screened for diabetes.  Cholesterol test: At age 39, start having a cholesterol test every 5 years, or more often if advised.  Bone density scan (DEXA): At age 50, ask your care team if you should have this scan for osteoporosis (brittle bones).  Hepatitis C: Get tested at least once in your  life.  STIs (sexually transmitted infections)  Before age 24: Ask your care team if you should be screened for STIs.  After age 24: Get screened for STIs if you're at risk. You are at risk for STIs (including HIV) if:  You are sexually active with more than one person.  You don't use condoms every time.  You or a partner was diagnosed with a sexually transmitted infection.  If you are at risk for HIV, ask about PrEP medicine to prevent HIV.  Get tested for HIV at least once in your life, whether you are at risk for HIV or not.  Cancer screening tests  Cervical cancer screening: If you have a cervix, begin getting regular cervical cancer screening tests at age 21. Most people who have regular screenings with normal results can stop after age 65. Talk about this with your provider.  Breast cancer scan (mammogram): If you've ever had breasts, begin having regular mammograms starting at age 40. This is a scan to check for breast cancer.  Colon cancer screening: It is important to start screening for colon cancer at age 45.  Have a colonoscopy test every 10 years (or more often if you're at risk) Or, ask your provider about stool tests like a FIT test every year or Cologuard test every 3 years.  To learn more about your testing options, visit: https://[email or web address]/691909.pdf.  For help making a decision, visit: https://bit.ly/ft35421.  Prostate cancer screening test: If you have a prostate and are age 55 to 69, ask your provider if you would benefit from a yearly prostate cancer screening test.  Lung cancer screening: If you are a current or former smoker age 50 to 80, ask your care team if ongoing lung cancer screenings are right for you.  For informational purposes only. Not to replace the advice of your health care provider. Copyright   2023 [employer]. All rights reserved. Clinically reviewed by the [facility] Transitions Program. [employer] [id number] - REV 01/24.

## 2024-05-06 ENCOUNTER — OFFICE VISIT (OUTPATIENT)
Dept: FAMILY MEDICINE | Facility: OTHER | Age: 39
End: 2024-05-06
Attending: FAMILY MEDICINE
Payer: COMMERCIAL

## 2024-05-06 VITALS
SYSTOLIC BLOOD PRESSURE: 115 MMHG | WEIGHT: 165 LBS | TEMPERATURE: 98.1 F | RESPIRATION RATE: 16 BRPM | HEART RATE: 60 BPM | HEIGHT: 59 IN | OXYGEN SATURATION: 97 % | DIASTOLIC BLOOD PRESSURE: 76 MMHG | BODY MASS INDEX: 33.26 KG/M2

## 2024-05-06 DIAGNOSIS — Z12.4 SCREENING FOR MALIGNANT NEOPLASM OF CERVIX: ICD-10-CM

## 2024-05-06 DIAGNOSIS — O24.419 GESTATIONAL DIABETES MELLITUS (GDM), ANTEPARTUM, GESTATIONAL DIABETES METHOD OF CONTROL UNSPECIFIED: ICD-10-CM

## 2024-05-06 DIAGNOSIS — Z11.59 ENCOUNTER FOR HEPATITIS C SCREENING TEST FOR LOW RISK PATIENT: ICD-10-CM

## 2024-05-06 DIAGNOSIS — Z00.00 ROUTINE GENERAL MEDICAL EXAMINATION AT A HEALTH CARE FACILITY: Primary | ICD-10-CM

## 2024-05-06 DIAGNOSIS — Z13.220 LIPID SCREENING: ICD-10-CM

## 2024-05-06 LAB
ANION GAP SERPL CALCULATED.3IONS-SCNC: 10 MMOL/L (ref 7–15)
BUN SERPL-MCNC: 12.3 MG/DL (ref 6–20)
CALCIUM SERPL-MCNC: 8.8 MG/DL (ref 8.6–10)
CHLORIDE SERPL-SCNC: 105 MMOL/L (ref 98–107)
CHOLEST SERPL-MCNC: 207 MG/DL
CREAT SERPL-MCNC: 0.6 MG/DL (ref 0.51–0.95)
DEPRECATED HCO3 PLAS-SCNC: 26 MMOL/L (ref 22–29)
EGFRCR SERPLBLD CKD-EPI 2021: >90 ML/MIN/1.73M2
EST. AVERAGE GLUCOSE BLD GHB EST-MCNC: 120 MG/DL
FASTING STATUS PATIENT QL REPORTED: NO
GLUCOSE SERPL-MCNC: 99 MG/DL (ref 70–99)
HBA1C MFR BLD: 5.8 %
HDLC SERPL-MCNC: 51 MG/DL
LDLC SERPL CALC-MCNC: 136 MG/DL
NONHDLC SERPL-MCNC: 156 MG/DL
POTASSIUM SERPL-SCNC: 4.3 MMOL/L (ref 3.4–5.3)
SODIUM SERPL-SCNC: 141 MMOL/L (ref 135–145)
TRIGL SERPL-MCNC: 102 MG/DL

## 2024-05-06 PROCEDURE — 83036 HEMOGLOBIN GLYCOSYLATED A1C: CPT | Performed by: FAMILY MEDICINE

## 2024-05-06 PROCEDURE — 80048 BASIC METABOLIC PNL TOTAL CA: CPT | Performed by: FAMILY MEDICINE

## 2024-05-06 PROCEDURE — 99395 PREV VISIT EST AGE 18-39: CPT | Performed by: FAMILY MEDICINE

## 2024-05-06 PROCEDURE — 86803 HEPATITIS C AB TEST: CPT | Performed by: FAMILY MEDICINE

## 2024-05-06 PROCEDURE — 80061 LIPID PANEL: CPT | Performed by: FAMILY MEDICINE

## 2024-05-06 PROCEDURE — 36415 COLL VENOUS BLD VENIPUNCTURE: CPT | Performed by: FAMILY MEDICINE

## 2024-05-06 SDOH — HEALTH STABILITY: PHYSICAL HEALTH: ON AVERAGE, HOW MANY DAYS PER WEEK DO YOU ENGAGE IN MODERATE TO STRENUOUS EXERCISE (LIKE A BRISK WALK)?: 0 DAYS

## 2024-05-06 ASSESSMENT — PAIN SCALES - GENERAL: PAINLEVEL: NO PAIN (0)

## 2024-05-06 ASSESSMENT — SOCIAL DETERMINANTS OF HEALTH (SDOH): HOW OFTEN DO YOU GET TOGETHER WITH FRIENDS OR RELATIVES?: ONCE A WEEK

## 2024-05-06 NOTE — PROGRESS NOTES
"Preventive Care Visit  [facility]  Negin Mohr MD, Family Medicine  May 6, 2024      Assessment & Plan     Routine general medical examination at a health care facility  Discussed and updated preventive cares  Repeat wellness visit in one year     Screening for malignant neoplasm of cervix  Will make a pap only appt d/t currently on her period    Encounter for hepatitis C screening test for low risk patient  - Hepatitis C Screen Reflex to HCV RNA Quant and Genotype; Future  - Hepatitis C Screen Reflex to HCV RNA Quant and Genotype    Lipid screening  Improved!  - Lipid Profile; Future  - Basic metabolic panel; Future  - Lipid Profile  - Basic metabolic panel    Gestational diabetes mellitus (GDM), antepartum, gestational diabetes method of control unspecified  A1c 5.8, stable  - Hemoglobin A1c; Future  - Hemoglobin A1c  - no diabetic medications         BMI  Estimated body mass index is 33.33 kg/m  as calculated from the following:    Height as of this encounter: 1.499 m (4' 11\").    Weight as of this encounter: 74.8 kg (165 lb).   Weight management plan: Discussed healthy diet and exercise guidelines    Counseling  Appropriate preventive services were discussed with this patient, including applicable screening as appropriate for fall prevention, nutrition, physical activity, Tobacco-use cessation, weight loss and cognition.  Checklist reviewing preventive services available has been given to the patient.  Reviewed patient's diet, addressing concerns and/or questions.   She is at risk for psychosocial distress and has been provided with information to reduce risk.       See Patient Instructions    Return in about 53 weeks (around 5/12/2025) for Annual Wellness Visit.    Pedrito Joseph is a 38 year old, presenting for the following:  Physical        5/6/2024     8:08 AM   Additional Questions   Roomed by Shantelle Harrison   Accompanied by self        Health Care Directive  Patient does not " have a Health Care Directive or Living Will: Discussed advance care planning with patient; however, patient declined at this time.    HPI        5/6/2024   General Health   How would you rate your overall physical health? Good   Feel stress (tense, anxious, or unable to sleep) Only a little   (!) STRESS CONCERN      5/6/2024   Nutrition   Three or more servings of calcium each day? Yes   Diet: Regular (no restrictions)   How many servings of fruit and vegetables per day? (!) 2-3   How many sweetened beverages each day? 0-1         5/6/2024   Exercise   Days per week of moderate/strenous exercise 0 days   (!) EXERCISE CONCERN      5/6/2024   Social Factors   Frequency of gathering with friends or relatives Once a week   Worry food won't last until get money to buy more No   Food not last or not have enough money for food? No   Do you have housing?  Yes   Are you worried about losing your housing? No   Lack of transportation? No   Unable to get utilities (heat,electricity)? No         5/6/2024   Dental   Dentist two times every year? Yes            Today's PHQ-2 Score:       1/17/2024     8:12 AM   PHQ-2 ( 1999 [employer])   Q1: Little interest or pleasure in doing things 0   Q2: Feeling down, depressed or hopeless 0   PHQ-2 Score 0         5/6/2024   Substance Use   Alcohol more than 3/day or more than 7/wk No   Do you use any other substances recreationally? No     Social History     Tobacco Use    Smoking status: Never     Passive exposure: Never    Smokeless tobacco: Never   Vaping Use    Vaping status: Never Used   Substance Use Topics    Alcohol use: Yes     Comment: socially    Drug use: No             5/6/2024   Breast Cancer Screening   Family history of breast, colon, or ovarian cancer? No / Unknown      Mammogram Screening - Patient under 40 years of age: Routine Mammogram Screening not recommended.         5/6/2024   STI Screening   New sexual partner(s) since last STI/HIV test? No     History of abnormal Pap  "smear: NO - age 30-65 PAP every 5 years with negative HPV co-testing recommended        4/24/2019     8:39 AM 3/3/2016    12:00 AM   PAP / HPV   PAP (Historical)  negative       PAP-ABSTRACT See Scanned Document            This result is from an external source.     # GERD  - acid reflux once per month  - TUMS takes care of symptoms  - abd pain since having twins    # Wellness:  - Pap: due, but on her period today. Will make another pap only appt  - Mammogram: NA  - STDs: no concerns  - Contraception: s/p tubal  - Immunizations: COVID - declines  - Lipids: LDL (4/5/2023) 163 with cholesterol 249  - Dexa scan:  - Colon cancer screening:  - Lung cancer screening: never smoker  - AAA screening:     Labs: lipid, hepc       Reviewed and updated as needed this visit by Provider   Tobacco  Allergies  Meds  Problems  Med Hx  Surg Hx  Fam Hx     Sexual Activity                Review of Systems  Constitutional, HEENT, cardiovascular, pulmonary, gi and gu systems are negative, except as otherwise noted.     Objective    Exam  /76   Pulse 60   Temp 98.1  F (36.7  C) (Tympanic)   Resp 16   Ht 1.499 m (4' 11\")   Wt 74.8 kg (165 lb)   LMP 04/11/2024 (Within Days)   SpO2 97%   BMI 33.33 kg/m     Estimated body mass index is 33.33 kg/m  as calculated from the following:    Height as of this encounter: 1.499 m (4' 11\").    Weight as of this encounter: 74.8 kg (165 lb).    Physical Exam  Constitutional:       General: She is not in acute distress.     Appearance: She is well-developed.   HENT:      Head: Normocephalic and atraumatic.      Right Ear: Hearing and tympanic membrane normal.      Left Ear: Hearing and tympanic membrane normal.      Mouth/Throat:      Mouth: Mucous membranes are moist.      Pharynx: No oropharyngeal exudate.   Eyes:      Extraocular Movements: Extraocular movements intact.      Conjunctiva/sclera: Conjunctivae normal.   Neck:      Thyroid: No thyromegaly.   Cardiovascular:      Rate " and Rhythm: Normal rate and regular rhythm.      Pulses: Normal pulses.      Heart sounds: Normal heart sounds. No murmur heard.  Pulmonary:      Effort: Pulmonary effort is normal. No respiratory distress.      Breath sounds: Normal breath sounds. No wheezing or rales.   Abdominal:      General: Bowel sounds are normal. There is no distension.      Palpations: Abdomen is soft.      Tenderness: There is abdominal tenderness in the periumbilical area. There is no guarding.   Musculoskeletal:         General: Normal range of motion.      Cervical back: Normal range of motion and neck supple.      Right lower leg: No edema.      Left lower leg: No edema.   Lymphadenopathy:      Cervical: No cervical adenopathy.   Skin:     General: Skin is dry.   Neurological:      Mental Status: She is alert.   Psychiatric:         Mood and Affect: Mood normal.       Results for orders placed or performed in visit on 05/06/24   Lipid Profile     Status: Abnormal   Result Value Ref Range    Cholesterol 207 (H) <200 mg/dL    Triglycerides 102 <150 mg/dL    Direct Measure HDL 51 >=50 mg/dL    LDL Cholesterol Calculated 136 (H) <=100 mg/dL    Non HDL Cholesterol 156 (H) <130 mg/dL    Patient Fasting > 8hrs? No     Narrative    Cholesterol  Desirable:  <200 mg/dL    Triglycerides  Normal:  Less than 150 mg/dL  Borderline High:  150-199 mg/dL  High:  200-499 mg/dL  Very High:  Greater than or equal to 500 mg/dL    Direct Measure HDL  Female:  Greater than or equal to 50 mg/dL   Male:  Greater than or equal to 40 mg/dL    LDL Cholesterol  Desirable:  <100mg/dL  Above Desirable:  100-129 mg/dL   Borderline High:  130-159 mg/dL   High:  160-189 mg/dL   Very High:  >= 190 mg/dL    Non HDL Cholesterol  Desirable:  130 mg/dL  Above Desirable:  130-159 mg/dL  Borderline High:  160-189 mg/dL  High:  190-219 mg/dL  Very High:  Greater than or equal to 220 mg/dL   Basic metabolic panel     Status: Normal   Result Value Ref Range    Sodium 141 135 -  145 mmol/L    Potassium 4.3 3.4 - 5.3 mmol/L    Chloride 105 98 - 107 mmol/L    Carbon Dioxide (CO2) 26 22 - 29 mmol/L    Anion Gap 10 7 - 15 mmol/L    Urea Nitrogen 12.3 6.0 - 20.0 mg/dL    Creatinine 0.60 0.51 - 0.95 mg/dL    GFR Estimate >90 >60 mL/min/1.73m2    Calcium 8.8 8.6 - 10.0 mg/dL    Glucose 99 70 - 99 mg/dL   Hemoglobin A1c     Status: Abnormal   Result Value Ref Range    Estimated Average Glucose 120 mg/dL    Hemoglobin A1C 5.8 (H) <5.7 %             Signed Electronically by: Negin Mohr MD

## 2024-05-07 LAB — HCV AB SERPL QL IA: NONREACTIVE

## 2024-05-09 ENCOUNTER — APPOINTMENT (OUTPATIENT)
Dept: OCCUPATIONAL MEDICINE | Facility: OTHER | Age: 39
End: 2024-05-09

## 2024-05-29 NOTE — PROGRESS NOTES
Otolaryngology Consultation    Patient: Opal Henderson  : 1985    Patient presents with:  Otitis Media:  HEA / Subacute otitis media, unspecified otitis media type.  Tinnitus, bilateral. Decreased hearing both ears.  Referred by, Dr. Arrieta      HPI:  Opal Henderson is a 38 year old female seen today for AOM.  Patient was seen 2024 for acute left otitis media at that time she started on azithromycin and prednisone.  She returned on 3/5/2024 and was treated with Augmentin.  She was again seen on 2024 with left eustachian tube dysfunction she was started on prednisone and Flonase.  In allergies were discussed.  She has developed recurrent acute otitis media over the last few years. She had reported her ears have not cleared as prior. Ears remains plugged, decreased hearing for last several weeks without relief. No improvement with nasal sprays, steroids or abx.   Increase in sharp pain, tinnitus at times, dizziness.       Hx COM   Hx of BTT as a child.   Denies otorrhea  Denies worrisome tinnitus  Denies fluctuating hearing loss or tinnitus.   Denies vertigo or facial paraesthesia.   Nasal congestion  Hx of recurrent sinusitis  She has flonase and tries to use every day.       Audiogram- 24  Type B tympanograms  Thresholds are moderate rising to mild right and left moderate severe rising to mild- conductive loss both ears.   SRT=PTA  WRS-  Right- 100%@60 dB  Left- 100% @75 dB    Darshana, daughter, had recent T&A with me    Current Outpatient Rx   Medication Sig Dispense Refill    fluticasone (FLONASE) 50 MCG/ACT nasal spray Spray 1 spray into both nostrils daily 16 g 1       Allergies: Patient has no known allergies.     Past Medical History:   Diagnosis Date    HGSIL on Pap smear of cervix 05/10/2005    Pap smear of cervix with ASCUS, cannot exclude HGSIL 10/16/2012    Reflux esophagitis 2009       Past Surgical History:   Procedure Laterality Date    EXCISE LESION CHEEK Left 2023  "   Procedure: EXCISION LEFT CHEEK LESION, with incision measuring 2 cm, FROZENS;  Surgeon: Mercy Goldberg MD;  Location: [facility]    HERNIORRHAPHY UMBILICAL N/A 8/19/2016    Procedure: HERNIORRHAPHY UMBILICAL;  Surgeon: Mirza Baker MD;  Location: [facility]    LAPAROTOMY MINI, TUBAL LIGATION (POST PARTUM), COMBINED Bilateral 8/19/2016    Procedure: COMBINED LAPAROTOMY MINI, TUBAL LIGATION (POST PARTUM);  Surgeon: Mirza Baker MD;  Location: [facility] OR    PE TUBES  pt 10 years old       ENT family history reviewed    Social History     Tobacco Use    Smoking status: Never     Passive exposure: Never    Smokeless tobacco: Never   Vaping Use    Vaping status: Never Used   Substance Use Topics    Alcohol use: Yes     Comment: socially    Drug use: No       Review of Systems  ROS: 10 point ROS neg other than the symptoms noted above in the HPI     Physical Exam  /86 (BP Location: Right arm, Patient Position: Sitting, Cuff Size: Adult Regular)   Pulse (!) 48   Temp 97.8  F (36.6  C) (Tympanic)   Ht 1.499 m (4' 11\")   Wt 70.8 kg (156 lb)   LMP 04/11/2024 (Within Days)   SpO2 98%   BMI 31.51 kg/m      General - The patient is well nourished and well developed, and appears to have good nutritional status.  Alert and oriented to person and place, answers questions and cooperates with examination appropriately.   Head and Face - Normocephalic and atraumatic, with no gross asymmetry noted.  The facial nerve is intact, with strong symmetric movements.  Voice and Breathing - The patient was breathing comfortably without the use of accessory muscles. There was no wheezing, stridor, or stertor.  The patients voice was clear and strong, and had appropriate pitch and quality.  Ears - Ears examined with otoscope and under otologic microscopy. The external auditory canals are patent, the tympanic membranes are intact with effusions and severe bilateral pars tensa retraction, moderate attic retraction.  There may be partial " erosion of incus on the left.  Eyes - Extraocular movements intact, and the pupils were reactive to light.  Sclera were not icteric or injected, conjunctiva were pink and moist.  Mouth - Examination of the oral cavity showed pink, healthy oral mucosa. No lesions or ulcerations noted.  The tongue was mobile and midline, and the dentition were in good condition.    Throat - The walls of the oropharynx were smooth, pink, moist, symmetric, and had no lesions or ulcerations.  The tonsillar pillars and soft palate were symmetric.  The uvula was midline on elevation.    Neck - Normal midline excursion of the laryngotracheal complex during swallowing.  Full range of motion on passive movement.  Palpation of the occipital, submental, submandibular, internal jugular chain, and supraclavicular nodes did not demonstrate any abnormal lymph nodes or masses.  Palpation of the thyroid was soft and smooth, with no nodules or goiter appreciated.  The trachea was mobile and midline.  Nose - External contour is symmetric, no gross deflection or scars.  Nasal mucosa is pink and moist with no abnormal mucus.      After informed consent was obtained and the nose was anesthetized with topical neosynepherine/lidocaine, the scope was advanced into the nares.  There is no purulence and/ or excessive swelling.  Eustachian tubes are patent, no nasopharyngeal mass.      Bilateral Myringotomy with Tube Placement  I discussed the risks and complications of bilateral myringotomy with insertion of  t- tubes including anesthesia, bleeding, infection, change in hearing or hearing loss, tympanic membrane perforation, need for additional surgery, chronic ear drainage, tube occlusion or need for tube reinsertion, cholesteatoma.   Alternatives to tympanostomy tube insertion were discussed, and are largely limited to surveillance.  Medical therapy (antibiotics, antihistamines, decongestants, systemic steroids, and topical nasal steroids) are ineffective  for bilateral chronic otitis media with effusion, and not recommended.  Antibiotics are indicated in recurrent acute otitis media during active infections.  All questions were answered and the patient wishes are to proceed with surgical intervention.     Procedure - After discussion of the risks and benefits of myringotomy, informed consent was signed and placed in the chart.  Timeout was taken I began with the right side.  I proceeded to position the patient in a supine position in the examination chair.  Using the binocular surgical microscope, I then proceeded to clean the canal of cerumen and squamous debris.  I was able to see the tympanic membrane.  Using a small cotton tipped applicator, I applied a tiny coating of phenol onto the tympanic membrane.  After visualizing a good beny, I then proceeded to use a myringotomy knife to make a radially oriented incision in the tympanic membrane.  A moderate amount of clear yellow effusion was suctioned away.  Next, I proceeded to place a t-tube through the incision.  After confirming good positioning and a clearly visible open tube, Floxin drops were placed and the procedure was complete.    I know moved on to the left  side. Using the binocular surgical microscope, I then proceeded to clean the canal of cerumen and squamous debris.  I was able to see the tympanic membrane.  Using a small cotton tipped applicator, I applied a tiny coating of phenol onto the tympanic membrane.  After visualizing a good beny, I then proceeded to use a myringotomy knife to make a radially oriented incision in the tympanic membrane.  A moderate amount of clear yellow effusion was suctioned away.  There is severe pars tensa retraction and no middle ear space.  I attempted to place a T-tube but this was unsuccessful.  The tube was removed.  Floxin was placed.  Bilaterally there is no evidence of cholesteatoma  There may be partial erosion of the incus on the left          Impression  and Joshua Joseph SUNDEEP Henderson is a 38 year old female with:    ICD-10-CM    1. COME (chronic otitis media with effusion), bilateral  H65.493 lidocaine 2%-oxymetazoline 0.025% nasal solution 2 spray     phenol 89 % swab 1 Swab     ofloxacin (FLOXIN) 0.3 % otic solution     DISCONTINUED: ofloxacin (FLOXIN) 0.3 % otic solution 5 drop      2. Conductive hearing loss, bilateral  H90.0       3. Tinnitus, bilateral  H93.13 Adult ENT  Referral      4. ETD (Eustachian tube dysfunction), bilateral  H69.93 lidocaine 2%-oxymetazoline 0.025% nasal solution 2 spray      5. S/p bilateral myringotomy with tube placement  Z96.22 ofloxacin (FLOXIN) 0.3 % otic solution      6. Congestion of paranasal sinus  R09.81 budesonide (PULMICORT) 0.5 MG/2ML neb solution        Addendum:    CT temporal bone dated 7/8/2024   And is concerning for left tegmen erosion and complete opacification of the left middle ear and mastoid.  No obvious ossicular chain erosion, scutum sharp      rad read: shows soft tissue filling the middle ear cavity without acicular erosion no obvious bone erosion.  Poorly pneumatized left mastoid with complete opacification bilateral tympanic membrane retraction.    I will contact Dr. Christy and refer to otology.     Cynthia Christy MD Fredrickson, Kristin, MD; Alma Newell PA-C  Cc: Isac Matthews RN  Hi. No, go ahead and get the tube in. Maybe, since you made the myringotomy, there will be some air in there by the time you get to the OR. 70% nitrous on induction for 5-10 minutes can also be helpful for getting some air (nitrous) into the middle ear. Since you didn't get CSF out, no worries about that. Getting some air in the ear hopefully will help things overall. Her tegmen is pretty thin on the other side as well so, if there was an ear full of fluid on the right, it'd probably look about like the left. She's not hugely overweight but weight loss would help decrease her intracranial pressure. Unless there was an  active CSF leak, we wouldn't operate. The only way to know if it's truly cholesteatoma is to get the MRI but I'd still put the tube in first and hope that improves aeration.    Hope that helps.    Cynthia      Complete floxin  Follow-up in 1 month for audio and right tube check  CT temporal bone at follow-up   I recommend surgical placement left tube.  With the use of nitrous I am hopeful I will be able to place a tube.    Ear anatomy and audio reviewed.    Start budesonide irrigations    I discussed the risks and complications of Left myringotomy with insertion of  t- tube including anesthesia, bleeding, infection, change in hearing or hearing loss, tympanic membrane perforation, need for additional surgery, chronic ear drainage, tube occlusion or need for tube reinsertion, cholesteatoma.   All questions were answered and the patient/and or guardian wishes are to proceed with surgical intervention.       Mercy Goldberg D.O.  Otolaryngology/Head and Neck Surgery  Allergy    Also examined by:    Alma Newell PA-C  ENT  [facility]

## 2024-05-30 ENCOUNTER — OFFICE VISIT (OUTPATIENT)
Dept: OTOLARYNGOLOGY | Facility: OTHER | Age: 39
End: 2024-05-30
Attending: AUDIOLOGIST
Payer: COMMERCIAL

## 2024-05-30 ENCOUNTER — OFFICE VISIT (OUTPATIENT)
Dept: AUDIOLOGY | Facility: OTHER | Age: 39
End: 2024-05-30
Attending: AUDIOLOGIST
Payer: COMMERCIAL

## 2024-05-30 VITALS
DIASTOLIC BLOOD PRESSURE: 86 MMHG | TEMPERATURE: 97.8 F | HEIGHT: 59 IN | BODY MASS INDEX: 31.45 KG/M2 | SYSTOLIC BLOOD PRESSURE: 127 MMHG | HEART RATE: 48 BPM | WEIGHT: 156 LBS | OXYGEN SATURATION: 98 %

## 2024-05-30 DIAGNOSIS — R09.81 CONGESTION OF PARANASAL SINUS: ICD-10-CM

## 2024-05-30 DIAGNOSIS — H69.93 DYSFUNCTION OF EUSTACHIAN TUBE, BILATERAL: Primary | ICD-10-CM

## 2024-05-30 DIAGNOSIS — H91.90 DECREASED HEARING: ICD-10-CM

## 2024-05-30 DIAGNOSIS — H65.493 COME (CHRONIC OTITIS MEDIA WITH EFFUSION), BILATERAL: Primary | ICD-10-CM

## 2024-05-30 DIAGNOSIS — H69.93 ETD (EUSTACHIAN TUBE DYSFUNCTION), BILATERAL: ICD-10-CM

## 2024-05-30 DIAGNOSIS — Z96.22 S/P BILATERAL MYRINGOTOMY WITH TUBE PLACEMENT: ICD-10-CM

## 2024-05-30 DIAGNOSIS — H93.13 TINNITUS, BILATERAL: ICD-10-CM

## 2024-05-30 DIAGNOSIS — H90.0 CONDUCTIVE HEARING LOSS, BILATERAL: ICD-10-CM

## 2024-05-30 PROCEDURE — 99213 OFFICE O/P EST LOW 20 MIN: CPT | Mod: 25 | Performed by: OTOLARYNGOLOGY

## 2024-05-30 PROCEDURE — 250N000009 HC RX 250: Performed by: PHYSICIAN ASSISTANT

## 2024-05-30 PROCEDURE — 69420 INCISION OF EARDRUM: CPT | Mod: LT | Performed by: OTOLARYNGOLOGY

## 2024-05-30 PROCEDURE — 69433 CREATE EARDRUM OPENING: CPT | Mod: RT | Performed by: OTOLARYNGOLOGY

## 2024-05-30 PROCEDURE — 92567 TYMPANOMETRY: CPT | Performed by: AUDIOLOGIST

## 2024-05-30 PROCEDURE — 92557 COMPREHENSIVE HEARING TEST: CPT | Performed by: AUDIOLOGIST

## 2024-05-30 RX ORDER — BUDESONIDE 0.5 MG/2ML
INHALANT ORAL
Qty: 120 ML | Refills: 1 | Status: ON HOLD | OUTPATIENT
Start: 2024-05-30 | End: 2024-09-24

## 2024-05-30 RX ORDER — OFLOXACIN 3 MG/ML
5 SOLUTION AURICULAR (OTIC) 2 TIMES DAILY
Qty: 5 ML | Refills: 0 | Status: SHIPPED | OUTPATIENT
Start: 2024-05-30 | End: 2024-06-06

## 2024-05-30 RX ORDER — OFLOXACIN 3 MG/ML
5 SOLUTION AURICULAR (OTIC) DAILY
Status: DISCONTINUED | OUTPATIENT
Start: 2024-05-30 | End: 2024-05-30

## 2024-05-30 RX ADMIN — OFLOXACIN 5 DROP: 3 SOLUTION AURICULAR (OTIC) at 11:25

## 2024-05-30 ASSESSMENT — PAIN SCALES - GENERAL: PAINLEVEL: NO PAIN (0)

## 2024-05-30 NOTE — LETTER
2024         RE: Opal Henderson  [address] [facility] [address]        Dear Colleague,    Thank you for referring your patient, Opal Henderson, to the [facility]. Please see a copy of my visit note below.    Otolaryngology Consultation    Patient: Opal Henderson  : 1985    Patient presents with:  Otitis Media:  HEA / Subacute otitis media, unspecified otitis media type.  Tinnitus, bilateral. Decreased hearing both ears.  Referred by, Dr. Arrieta      HPI:  Opal Henderson is a 38 year old female seen today for AOM.  Patient was seen 2024 for acute left otitis media at that time she started on azithromycin and prednisone.  She returned on 3/5/2024 and was treated with Augmentin.  She was again seen on 2024 with left eustachian tube dysfunction she was started on prednisone and Flonase.  In allergies were discussed.  She has developed recurrent acute otitis media over the last few years. She had reported her ears have not cleared as prior. Ears remains plugged, decreased hearing for last several weeks without relief. No improvement with nasal sprays, steroids or abx.   Increase in sharp pain, tinnitus at times, dizziness.       Hx COM   Hx of BTT as a child.   Denies otorrhea  Denies worrisome tinnitus  Denies fluctuating hearing loss or tinnitus.   Denies vertigo or facial paraesthesia.   Nasal congestion  Hx of recurrent sinusitis  She has flonase and tries to use every day.       Audiogram- 24  Type B tympanograms  Thresholds are moderate rising to mild right and left moderate severe rising to mild- conductive loss both ears.   SRT=PTA  WRS-  Right- 100%@60 dB  Left- 100% @75 dB    Darshana, daughter, had recent T&A with me    Current Outpatient Rx   Medication Sig Dispense Refill     fluticasone (FLONASE) 50 MCG/ACT nasal spray Spray 1 spray into both nostrils daily 16 g 1       Allergies: Patient has no known allergies.     Past Medical History:  "  Diagnosis Date     HGSIL on Pap smear of cervix 05/10/2005     Pap smear of cervix with ASCUS, cannot exclude HGSIL 10/16/2012     Reflux esophagitis 04/07/2009       Past Surgical History:   Procedure Laterality Date     EXCISE LESION CHEEK Left 12/27/2023    Procedure: EXCISION LEFT CHEEK LESION, with incision measuring 2 cm, FROZENS;  Surgeon: Mercy Goldberg MD;  Location: [facility]     HERNIORRHAPHY UMBILICAL N/A 8/19/2016    Procedure: HERNIORRHAPHY UMBILICAL;  Surgeon: Mirza Baker MD;  Location: [facility]     LAPAROTOMY MINI, TUBAL LIGATION (POST PARTUM), COMBINED Bilateral 8/19/2016    Procedure: COMBINED LAPAROTOMY MINI, TUBAL LIGATION (POST PARTUM);  Surgeon: Mirza Baker MD;  Location: [facility] OR     PE TUBES  pt 10 years old       ENT family history reviewed    Social History     Tobacco Use     Smoking status: Never     Passive exposure: Never     Smokeless tobacco: Never   Vaping Use     Vaping status: Never Used   Substance Use Topics     Alcohol use: Yes     Comment: socially     Drug use: No       Review of Systems  ROS: 10 point ROS neg other than the symptoms noted above in the HPI     Physical Exam  /86 (BP Location: Right arm, Patient Position: Sitting, Cuff Size: Adult Regular)   Pulse (!) 48   Temp 97.8  F (36.6  C) (Tympanic)   Ht 1.499 m (4' 11\")   Wt 70.8 kg (156 lb)   LMP 04/11/2024 (Within Days)   SpO2 98%   BMI 31.51 kg/m      General - The patient is well nourished and well developed, and appears to have good nutritional status.  Alert and oriented to person and place, answers questions and cooperates with examination appropriately.   Head and Face - Normocephalic and atraumatic, with no gross asymmetry noted.  The facial nerve is intact, with strong symmetric movements.  Voice and Breathing - The patient was breathing comfortably without the use of accessory muscles. There was no wheezing, stridor, or stertor.  The patients voice was clear and strong, and had appropriate " pitch and quality.  Ears - Ears examined with otoscope and under otologic microscopy. The external auditory canals are patent, the tympanic membranes are intact with effusions and severe bilateral pars tensa retraction, moderate attic retraction.  There may be partial erosion of incus on the left.  Eyes - Extraocular movements intact, and the pupils were reactive to light.  Sclera were not icteric or injected, conjunctiva were pink and moist.  Mouth - Examination of the oral cavity showed pink, healthy oral mucosa. No lesions or ulcerations noted.  The tongue was mobile and midline, and the dentition were in good condition.    Throat - The walls of the oropharynx were smooth, pink, moist, symmetric, and had no lesions or ulcerations.  The tonsillar pillars and soft palate were symmetric.  The uvula was midline on elevation.    Neck - Normal midline excursion of the laryngotracheal complex during swallowing.  Full range of motion on passive movement.  Palpation of the occipital, submental, submandibular, internal jugular chain, and supraclavicular nodes did not demonstrate any abnormal lymph nodes or masses.  Palpation of the thyroid was soft and smooth, with no nodules or goiter appreciated.  The trachea was mobile and midline.  Nose - External contour is symmetric, no gross deflection or scars.  Nasal mucosa is pink and moist with no abnormal mucus.      After informed consent was obtained and the nose was anesthetized with topical neosynepherine/lidocaine, the scope was advanced into the nares.  There is no purulence and/ or excessive swelling.  Eustachian tubes are patent, no nasopharyngeal mass.      Bilateral Myringotomy with Tube Placement  I discussed the risks and complications of bilateral myringotomy with insertion of  t- tubes including anesthesia, bleeding, infection, change in hearing or hearing loss, tympanic membrane perforation, need for additional surgery, chronic ear drainage, tube occlusion or  need for tube reinsertion, cholesteatoma.   Alternatives to tympanostomy tube insertion were discussed, and are largely limited to surveillance.  Medical therapy (antibiotics, antihistamines, decongestants, systemic steroids, and topical nasal steroids) are ineffective for bilateral chronic otitis media with effusion, and not recommended.  Antibiotics are indicated in recurrent acute otitis media during active infections.  All questions were answered and the patient wishes are to proceed with surgical intervention.     Procedure - After discussion of the risks and benefits of myringotomy, informed consent was signed and placed in the chart.  Timeout was taken I began with the right side.  I proceeded to position the patient in a supine position in the examination chair.  Using the binocular surgical microscope, I then proceeded to clean the canal of cerumen and squamous debris.  I was able to see the tympanic membrane.  Using a small cotton tipped applicator, I applied a tiny coating of phenol onto the tympanic membrane.  After visualizing a good beny, I then proceeded to use a myringotomy knife to make a radially oriented incision in the tympanic membrane.  A moderate amount of clear yellow effusion was suctioned away.  Next, I proceeded to place a t-tube through the incision.  After confirming good positioning and a clearly visible open tube, Floxin drops were placed and the procedure was complete.    I know moved on to the left  side. Using the binocular surgical microscope, I then proceeded to clean the canal of cerumen and squamous debris.  I was able to see the tympanic membrane.  Using a small cotton tipped applicator, I applied a tiny coating of phenol onto the tympanic membrane.  After visualizing a good beny, I then proceeded to use a myringotomy knife to make a radially oriented incision in the tympanic membrane.  A moderate amount of clear yellow effusion was suctioned away.  There is severe pars  tensa retraction and no middle ear space.  I attempted to place a T-tube but this was unsuccessful.  The tube was removed.  Floxin was placed.  Bilaterally there is no evidence of cholesteatoma  There may be partial erosion of the incus on the left          Impression and Plan- Opal Henderson is a 38 year old female with:    ICD-10-CM    1. COME (chronic otitis media with effusion), bilateral  H65.493 lidocaine 2%-oxymetazoline 0.025% nasal solution 2 spray     phenol 89 % swab 1 Swab     ofloxacin (FLOXIN) 0.3 % otic solution     DISCONTINUED: ofloxacin (FLOXIN) 0.3 % otic solution 5 drop      2. Conductive hearing loss, bilateral  H90.0       3. Tinnitus, bilateral  H93.13 Adult ENT  Referral      4. ETD (Eustachian tube dysfunction), bilateral  H69.93 lidocaine 2%-oxymetazoline 0.025% nasal solution 2 spray      5. S/p bilateral myringotomy with tube placement  Z96.22 ofloxacin (FLOXIN) 0.3 % otic solution      6. Congestion of paranasal sinus  R09.81 budesonide (PULMICORT) 0.5 MG/2ML neb solution              Complete floxin  Follow-up in 1 month for audio and right tube check  CT temporal bone at follow-up   I recommend surgical placement left tube.  With the use of nitrous I am hopeful I will be able to place a tube.    Ear anatomy and audio reviewed.    Start budesonide irrigations    I discussed the risks and complications of Left myringotomy with insertion of  t- tube including anesthesia, bleeding, infection, change in hearing or hearing loss, tympanic membrane perforation, need for additional surgery, chronic ear drainage, tube occlusion or need for tube reinsertion, cholesteatoma.   All questions were answered and the patient/and or guardian wishes are to proceed with surgical intervention.       Mercy Goldberg D.O.  Otolaryngology/Head and Neck Surgery  Allergy    Also examined by:    Alma Newell PA-C  ENT  [facility], [place]      Again, thank you for allowing me to participate in  the care of your patient.        Sincerely,        Alma Newell PA-C

## 2024-05-30 NOTE — PROGRESS NOTES
Audiology Evaluation Completed. Please refer SCANNED AUDIOGRAM and/or TYMPANOGRAM for BACKGROUND, RESULTS, RECOMMENDATIONS.      Madalyn FRAZIER, [facility]  Audiologist #[id number]

## 2024-05-30 NOTE — PATIENT INSTRUCTIONS
Start Floxin 5 drops twice a day for 7 days to both ears.   Start Budesonide rinses. Rinse 1-2 times daily  Continue with Flonase.     Follow-up in one month with Alma or Bria for recheck of ears  Complete an audiogram in 1-2 months    Thank you for allowing KRIS Fraire and our ENT team to participate in your care.  If your medications are too expensive, please give the nurse a call.  We can possibly change this medication.  If you have a scheduling or an appointment question please contact our Health Unit Coordinator at their direct line [phone].   ALL nursing questions or concerns can be directed to your ENT nurse, Alisson at: [phone].'    Budesonide nasal saline irrigation per instructions:  -Obtain Saqib Med Sinus rinse over the counter.    -Use warm distilled water and 2 packets of the salt solution that comes with the bottle, dissolve in bottle up to the 240 mL guy.  -Add 1 vial of budesonide.  -Irrigate each side of your nose leaning over the sink, using 1/3 to 1/2 the volume of the bottle in each nostril every irrigation.  Irrigate 2 times daily.  -If additional rinses are needed/recommended, you may use the plan Saqib Med Sinus irrigation without the use of added budesonide        Instructions for Myringotomy Tubes ( Ear Tubes)      Take one dose of liquid or chewable TYLENOL based on weight the morning of surgery.   If you can swallow pills you may take tylenol tablets with a small sip of water.  If you have any dosing questions ask your pharmacist.         Recovery - The placement of ear tubes is a brief operation, and therefore the recovery from the anesthetic is usually less than a day.  However, in young children the sleep patterns, feeding, and behavior can be altered for several days.  Try to return to the daily routine as soon as possible and this issue will resolve without problems.  There are no restrictions to diet or activity after ear tube placement.    Medications - Children and adults  can return to all preoperative medications after this procedure, including blood thinners.  You were sent home with ear drops, please use them as directed to assist in the rapid healing of the ear drum around the tube.  Pain medication may have been sent home with you, but a vast majority of the time, over the counter Tylenol or ibuprofen (advil) I sufficient. Finish prescription ear drops (4 drops twice a day).     Complications - A low grade fever (up to 100 degrees ) is not unusual in the day after tubes are placed.  Treat this with cool wash cloths to the forehead and Tylenol.  If the fever is higher, or does not respond to medication, call the Doctor s office or call service after hours.  A small amount of bloody drainage can occur for a day or two after ear tubes, and is perfectly normal, continue the ear drops as directed and it will clear up.    Water Precautions - Recent clinical research has shown that absolute water precautions are not always necessary.  Ear plugs or water head bands are not necessary unless the ear is actively draining, or if your child does not like the sensation of water in the ear.    Follow up - Approximately 1 month after the tubes are placed I like to examine the ears to make sure there are no signs of complications, which are extremely rare.  You should already have an appointment in 1 month with ENT PA and audiology.  If not, call our office at [phone].  In some unusual cases the ears  reject  the tubes.  Depending on the situation, a hearing test may or may not be performed at that time.  Afterwards, follow up is done every 6 months, but of course earlier if there are any issues or problems.    Advantages of Tubes - After ear tube placement, there are certain benefits from having a direct communication of the middle ear space with the ear canal.  In the event of drainage from the ears with ear tubes in place ( which is common with colds and flus ) use the ear drops you were  discharged home with using the same dosage and instructions.  This will clear up the ears without the need for oral antibiotics a majority of the time.  Another advantage is that with tubes in place, the ears automatically adjust to changes in atmospheric pressure ( such as in airplanes or elevation ).  In other words, if the tubes are open the ears will not hurt or pop!

## 2024-06-13 DIAGNOSIS — H91.93 DECREASED HEARING OF BOTH EARS: Primary | ICD-10-CM

## 2024-06-26 NOTE — PROGRESS NOTES
"Chief Complaint   Patient presents with    Follow Up    Hearing Problem     HEA//I month recheck       Patient returns to ENT for follow up exam  Right ear does cause her some intermittent discomfort and pressure. No otorrhea.   She completed drops.   Her hearing has improved on right side, but not the left.         Audiogram   Type right ear.  Low volume left.  Tympanograms  Thresholds are improved right to primarily sensorineural component.  Left remains with stable conductive hearing loss.  SRT=PTA  WRS-  Right- 100%@55dB  Left- 100% @75 dB      Past Medical History:   Diagnosis Date    HGSIL on Pap smear of cervix 05/10/2005    Pap smear of cervix with ASCUS, cannot exclude HGSIL 10/16/2012    Reflux esophagitis 04/07/2009      No Known Allergies  Current Outpatient Medications   Medication Sig Dispense Refill    budesonide (PULMICORT) 0.5 MG/2ML neb solution Make 240 cc Saqib med sinus irrigation Mix 2 ml vial of budesonide 0.5 mg Rinse 1-2 times daily. 120 mL 1    fluticasone (FLONASE) 50 MCG/ACT nasal spray Spray 1 spray into both nostrils daily 16 g 1     No current facility-administered medications for this visit.     ROS- SEE HPI  /60 (BP Location: Right arm, Patient Position: Sitting, Cuff Size: Adult Regular)   Pulse 65   Temp 97.6  F (36.4  C) (Tympanic)   Resp 18   Ht 1.499 m (4' 11\")   Wt 70.3 kg (155 lb)   SpO2 98%   BMI 31.31 kg/m      General - The patient is well nourished and well developed, and appears to have good nutritional status.  Alert and oriented to person and place, answers questions and cooperates with examination appropriately.   Head and Face - Normocephalic and atraumatic, with no gross asymmetry noted.  The facial nerve is intact, with strong symmetric movements.  Voice and Breathing - The patient was breathing comfortably without the use of accessory muscles. There was no wheezing, stridor, or stertor.  The patients voice was clear and strong, and had appropriate pitch " and quality.  Ears -right EAC was clear.  Right tympanic membrane appears with patent T-tube.  Left tympanic membrane appears with paracentral retraction.  Effusion.  Eyes - Extraocular movements intact, and the pupils were reactive to light.  Sclera were not icteric or injected, conjunctiva were pink and moist.  Mouth - Examination of the oral cavity showed pink, healthy oral mucosa. No lesions or ulcerations noted.  The tongue was mobile and midline, and the dentition were in good condition.    Throat - The walls of the oropharynx were smooth, pink, moist, symmetric, and had no lesions or ulcerations.  The tonsillar pillars and soft palate were symmetric.  The uvula was midline on elevation.    Neck - Normal midline excursion of the laryngotracheal complex during swallowing.  Full range of motion on passive movement.  Palpation of the occipital, submental, submandibular, internal jugular chain, and supraclavicular nodes did not demonstrate any abnormal lymph nodes or masses.  Palpation of the thyroid was soft and smooth, with no nodules or goiter appreciated.  The trachea was mobile and midline.  Nose - External contour is symmetric, no gross deflection or scars.  Nasal mucosa is pink and moist with no abnormal mucus.         ASSESSMENT/ PLAN:    ICD-10-CM    1. COME (chronic otitis media with effusion), left  H65.492 CT Temporal Bones wo Contrast      2. Conductive hearing loss of left ear with restricted hearing of right ear  H90.A12 CT Temporal Bones wo Contrast      3. S/p Left myringotomy with tube placement  Z96.22 CT Temporal Bones wo Contrast      4. TMJ (temporomandibular joint syndrome)  M26.609 Physical Therapy  Referral          Right ear- Tube is in good position and patent.   Hearing improved on right    Left ear- Recommend tube placement under anesthesia.   Proceed with left myringotomy with tube insertion with Dr. Goldberg as scheduled   Complete CT of temporal bone, mastoid   Follow up  in surgery for left tube placement    Referral to PT for TMJ.     Continued medical therapy versus surgical intervention discussed.  The surgical risks and complications of general anesthesia, bleeding, infection, tympanic membrane perforation, tube extrusion, clogging, hearing loss, chronic otorrhea (ear drainage), need for further surgery and cholesteatoma.  All questions are answered and the desire is to proceed with surgical intervention.      Alma Newell PA-C  ENT  [facility]

## 2024-06-27 ENCOUNTER — OFFICE VISIT (OUTPATIENT)
Dept: AUDIOLOGY | Facility: OTHER | Age: 39
End: 2024-06-27
Attending: AUDIOLOGIST
Payer: COMMERCIAL

## 2024-06-27 ENCOUNTER — OFFICE VISIT (OUTPATIENT)
Dept: OTOLARYNGOLOGY | Facility: OTHER | Age: 39
End: 2024-06-27
Attending: AUDIOLOGIST
Payer: COMMERCIAL

## 2024-06-27 ENCOUNTER — PREP FOR PROCEDURE (OUTPATIENT)
Dept: OTOLARYNGOLOGY | Facility: OTHER | Age: 39
End: 2024-06-27

## 2024-06-27 VITALS
RESPIRATION RATE: 18 BRPM | DIASTOLIC BLOOD PRESSURE: 60 MMHG | WEIGHT: 155 LBS | HEART RATE: 65 BPM | HEIGHT: 59 IN | BODY MASS INDEX: 31.25 KG/M2 | SYSTOLIC BLOOD PRESSURE: 126 MMHG | TEMPERATURE: 97.6 F | OXYGEN SATURATION: 98 %

## 2024-06-27 DIAGNOSIS — H65.492 COME (CHRONIC OTITIS MEDIA WITH EFFUSION), LEFT: Primary | ICD-10-CM

## 2024-06-27 DIAGNOSIS — H65.492 CHRONIC OTITIS MEDIA OF LEFT EAR WITH EFFUSION: Primary | ICD-10-CM

## 2024-06-27 DIAGNOSIS — H91.93 DECREASED HEARING OF BOTH EARS: ICD-10-CM

## 2024-06-27 DIAGNOSIS — H90.A12 CONDUCTIVE HEARING LOSS OF LEFT EAR WITH RESTRICTED HEARING OF RIGHT EAR: Primary | ICD-10-CM

## 2024-06-27 DIAGNOSIS — Z96.22 S/P BILATERAL MYRINGOTOMY WITH TUBE PLACEMENT: ICD-10-CM

## 2024-06-27 DIAGNOSIS — H90.2 CHL (CONDUCTIVE HEARING LOSS): ICD-10-CM

## 2024-06-27 DIAGNOSIS — M26.609 TMJ (TEMPOROMANDIBULAR JOINT SYNDROME): ICD-10-CM

## 2024-06-27 DIAGNOSIS — H90.A12 CONDUCTIVE HEARING LOSS OF LEFT EAR WITH RESTRICTED HEARING OF RIGHT EAR: ICD-10-CM

## 2024-06-27 PROCEDURE — 92567 TYMPANOMETRY: CPT | Performed by: AUDIOLOGIST

## 2024-06-27 PROCEDURE — 92552 PURE TONE AUDIOMETRY AIR: CPT | Performed by: AUDIOLOGIST

## 2024-06-27 PROCEDURE — 92556 SPEECH AUDIOMETRY COMPLETE: CPT | Performed by: AUDIOLOGIST

## 2024-06-27 PROCEDURE — 99213 OFFICE O/P EST LOW 20 MIN: CPT | Performed by: PHYSICIAN ASSISTANT

## 2024-06-27 ASSESSMENT — PAIN SCALES - GENERAL: PAINLEVEL: NO PAIN (0)

## 2024-06-27 NOTE — PATIENT INSTRUCTIONS
Right ear- Tube is in good position and patent.   Hearing improved on right    Left ear- Recommend tube placement under anesthesia.   Complete CT of temporal bone, mastoid   Follow up in surgery for left tube placement    Referral to PT for TMJ.       Thank you for allowing KRIS Fraire and our ENT team to participate in your care.  If your medications are too expensive, please give the nurse a call.  We can possibly change this medication.  If you have a scheduling or an appointment question please contact our Health Unit Coordinator at their direct line [phone].   ALL nursing questions or concerns can be directed to your ENT nurse, Alisson at: [phone].

## 2024-06-27 NOTE — LETTER
"6/27/2024      Opal Henderson  [address] [facility] [id number]      Dear Colleague,    Thank you for referring your patient, Opal Henderson, to the [facility]. Please see a copy of my visit note below.    Chief Complaint   Patient presents with     Follow Up     Hearing Problem     HEA//I month recheck       Patient returns to ENT for follow up exam  Right ear does cause her some intermittent discomfort and pressure. No otorrhea.   She completed drops.   Her hearing has improved on right side, but not the left.         Audiogram   Type right ear.  Low volume left.  Tympanograms  Thresholds are improved right to primarily sensorineural component.  Left remains with stable conductive hearing loss.  SRT=PTA  WRS-  Right- 100%@55dB  Left- 100% @75 dB      Past Medical History:   Diagnosis Date     HGSIL on Pap smear of cervix 05/10/2005     Pap smear of cervix with ASCUS, cannot exclude HGSIL 10/16/2012     Reflux esophagitis 04/07/2009      No Known Allergies  Current Outpatient Medications   Medication Sig Dispense Refill     budesonide (PULMICORT) 0.5 MG/2ML neb solution Make 240 cc Saqib med sinus irrigation Mix 2 ml vial of budesonide 0.5 mg Rinse 1-2 times daily. 120 mL 1     fluticasone (FLONASE) 50 MCG/ACT nasal spray Spray 1 spray into both nostrils daily 16 g 1     No current facility-administered medications for this visit.     ROS- SEE HPI  /60 (BP Location: Right arm, Patient Position: Sitting, Cuff Size: Adult Regular)   Pulse 65   Temp 97.6  F (36.4  C) (Tympanic)   Resp 18   Ht 1.499 m (4' 11\")   Wt 70.3 kg (155 lb)   SpO2 98%   BMI 31.31 kg/m      General - The patient is well nourished and well developed, and appears to have good nutritional status.  Alert and oriented to person and place, answers questions and cooperates with examination appropriately.   Head and Face - Normocephalic and atraumatic, with no gross asymmetry noted.  The facial nerve is intact, " with strong symmetric movements.  Voice and Breathing - The patient was breathing comfortably without the use of accessory muscles. There was no wheezing, stridor, or stertor.  The patients voice was clear and strong, and had appropriate pitch and quality.  Ears -right EAC was clear.  Right tympanic membrane appears with patent T-tube.  Left tympanic membrane appears with paracentral retraction.  Effusion.  Eyes - Extraocular movements intact, and the pupils were reactive to light.  Sclera were not icteric or injected, conjunctiva were pink and moist.  Mouth - Examination of the oral cavity showed pink, healthy oral mucosa. No lesions or ulcerations noted.  The tongue was mobile and midline, and the dentition were in good condition.    Throat - The walls of the oropharynx were smooth, pink, moist, symmetric, and had no lesions or ulcerations.  The tonsillar pillars and soft palate were symmetric.  The uvula was midline on elevation.    Neck - Normal midline excursion of the laryngotracheal complex during swallowing.  Full range of motion on passive movement.  Palpation of the occipital, submental, submandibular, internal jugular chain, and supraclavicular nodes did not demonstrate any abnormal lymph nodes or masses.  Palpation of the thyroid was soft and smooth, with no nodules or goiter appreciated.  The trachea was mobile and midline.  Nose - External contour is symmetric, no gross deflection or scars.  Nasal mucosa is pink and moist with no abnormal mucus.         ASSESSMENT/ PLAN:    ICD-10-CM    1. COME (chronic otitis media with effusion), left  H65.492 CT Temporal Bones wo Contrast      2. Conductive hearing loss of left ear with restricted hearing of right ear  H90.A12 CT Temporal Bones wo Contrast      3. S/p Left myringotomy with tube placement  Z96.22 CT Temporal Bones wo Contrast      4. TMJ (temporomandibular joint syndrome)  M26.609 Physical Therapy  Referral          Right ear- Tube is in  good position and patent.   Hearing improved on right    Left ear- Recommend tube placement under anesthesia.   Proceed with left myringotomy with tube insertion with Dr. Goldberg as scheduled   Complete CT of temporal bone, mastoid   Follow up in surgery for left tube placement    Referral to PT for TMJ.     Continued medical therapy versus surgical intervention discussed.  The surgical risks and complications of general anesthesia, bleeding, infection, tympanic membrane perforation, tube extrusion, clogging, hearing loss, chronic otorrhea (ear drainage), need for further surgery and cholesteatoma.  All questions are answered and the desire is to proceed with surgical intervention.      Alma Newell PA-C  ENT  [facility]      Again, thank you for allowing me to participate in the care of your patient.        Sincerely,        Alma Newell PA-C

## 2024-06-27 NOTE — PROGRESS NOTES
Audiology Evaluation Completed. Please refer SCANNED AUDIOGRAM and/or TYMPANOGRAM for BACKGROUND, RESULTS, RECOMMENDATIONS.      Madalyn FRAZIER, [facility]  Audiologist #[id number]

## 2024-07-01 ENCOUNTER — TELEPHONE (OUTPATIENT)
Dept: OTOLARYNGOLOGY | Facility: OTHER | Age: 39
End: 2024-07-01

## 2024-07-08 ENCOUNTER — HOSPITAL ENCOUNTER (OUTPATIENT)
Dept: CT IMAGING | Facility: HOSPITAL | Age: 39
Discharge: HOME | End: 2024-07-08
Attending: PHYSICIAN ASSISTANT | Admitting: PHYSICIAN ASSISTANT
Payer: COMMERCIAL

## 2024-07-08 DIAGNOSIS — H90.A12 CONDUCTIVE HEARING LOSS OF LEFT EAR WITH RESTRICTED HEARING OF RIGHT EAR: ICD-10-CM

## 2024-07-08 DIAGNOSIS — H65.492 COME (CHRONIC OTITIS MEDIA WITH EFFUSION), LEFT: ICD-10-CM

## 2024-07-08 DIAGNOSIS — Z96.22 S/P BILATERAL MYRINGOTOMY WITH TUBE PLACEMENT: ICD-10-CM

## 2024-07-08 PROCEDURE — 70480 CT ORBIT/EAR/FOSSA W/O DYE: CPT

## 2024-07-12 ENCOUNTER — TELEPHONE (OUTPATIENT)
Dept: OTOLARYNGOLOGY | Facility: OTHER | Age: 39
End: 2024-07-12

## 2024-07-12 DIAGNOSIS — H90.0 CONDUCTIVE HEARING LOSS, BILATERAL: Primary | ICD-10-CM

## 2024-07-12 NOTE — TELEPHONE ENCOUNTER
----- Message from Mercy Goldberg sent at 7/12/2024  1:32 PM CDT -----  Regarding: RE: referral  Isac please place order for CT temporal bone 1-1.5 months post op and have her see me back.  I will place referral to Dr. Christy if I still have concerns at that point.  Thanks Cynthia!  ----- Message -----  From: Cynthia Christy MD  Sent: 7/11/2024   8:21 PM CDT  To: Isac Matthews RN; Mercy Goldberg MD; #  Subject: RE: referral                                     Hi. No, go ahead and get the tube in. Maybe, since you made the myringotomy, there will be some air in there by the time you get to the OR. 70% nitrous on induction for 5-10 minutes can also be helpful for getting some air (nitrous) into the middle ear. Since you didn't get CSF out, no worries about that. Getting some air in the ear hopefully will help things overall. Her tegmen is pretty thin on the other side as well so, if there was an ear full of fluid on the right, it'd probably look about like the left. She's not hugely overweight but weight loss would help decrease her intracranial pressure. Unless there was an active CSF leak, we wouldn't operate. The only way to know if it's truly cholesteatoma is to get the MRI but I'd still put the tube in first and hope that improves aeration.    Hope that helps.    Cynthia  ----- Message -----  From: Mercy Goldberg MD  Sent: 7/11/2024   6:50 PM CDT  To: Isac Matthews RN; Cynthia Christy MD; #  Subject: referral                                         Hi Cynthia  I just reviewed Opal's CT and I am concerned about left tegmen erosion in setting of dense soft tissue opacification throughout middle ear/mastoid, concern for cholesteatoma.  Planned left surgical t-tube with me, unsuccessful in office.  Should I cancel my surgery and just send to you?  Let me know and Isac please contact patient  ----- Message -----  From: Alma Newell PA-C  Sent: 7/10/2024   2:35 PM CDT  To: Mercy Goldberg MD    Surgical  patient, tube 8/7/24.  ----- Message -----  From: Marlo, Radiant Ib  Sent: 7/8/2024   5:11 PM CDT  To: Alma Newell PA-C

## 2024-07-15 ENCOUNTER — TELEPHONE (OUTPATIENT)
Dept: OTOLARYNGOLOGY | Facility: OTHER | Age: 39
End: 2024-07-15

## 2024-07-15 NOTE — CONFIDENTIAL NOTE
July 15, 2024    Left message for patient to return call to schedule follow up appointment with Dr Goldberg 9/12 (follow unit(s) CT).    -Lissa Ibarra on 7/15/2024 at 9:46 AM

## 2024-07-30 ENCOUNTER — ANESTHESIA EVENT (OUTPATIENT)
Dept: SURGERY | Facility: HOSPITAL | Age: 39
End: 2024-07-30
Payer: COMMERCIAL

## 2024-07-30 NOTE — ANESTHESIA PREPROCEDURE EVALUATION
Anesthesia Pre-Procedure Evaluation    Patient: Opal Henderson   MRN: [id number] : 1985        Procedure : Procedure(s):  LEFT MYRINGOTOMY WITH TUBE INSERTION          Past Medical History:   Diagnosis Date    HGSIL on Pap smear of cervix 05/10/2005    Pap smear of cervix with ASCUS, cannot exclude HGSIL 10/16/2012    Reflux esophagitis 2009      Past Surgical History:   Procedure Laterality Date    EXCISE LESION CHEEK Left 2023    Procedure: EXCISION LEFT CHEEK LESION, with incision measuring 2 cm, FROZENS;  Surgeon: Mercy Goldberg MD;  Location: [facility] OR    HERNIORRHAPHY UMBILICAL N/A 2016    Procedure: HERNIORRHAPHY UMBILICAL;  Surgeon: Mirza Baker MD;  Location: [facility] OR    LAPAROTOMY MINI, TUBAL LIGATION (POST PARTUM), COMBINED Bilateral 2016    Procedure: COMBINED LAPAROTOMY MINI, TUBAL LIGATION (POST PARTUM);  Surgeon: Mirza Baker MD;  Location: [facility] OR    PE TUBES  pt 10 years old      No Known Allergies   Social History     Tobacco Use    Smoking status: Never     Passive exposure: Never    Smokeless tobacco: Never   Substance Use Topics    Alcohol use: Yes     Comment: socially      Wt Readings from Last 1 Encounters:   24 70.3 kg (155 lb)        Anesthesia Evaluation   Pt has had prior anesthetic. Type: MAC and General.        ROS/MED HX  ENT/Pulmonary: Comment: COME    (+)     JUANJOSE risk factors, snores loudly,   daytime somnolence,                               Neurologic:  - neg neurologic ROS     Cardiovascular: Comment: 2023 Zio XT patch report on Opal Henderson.  Ordered secondary to dizziness.        Worn for 14 days and 0 hr's.  After removing artifact, total time was 13 days and 18 hr's. Placed on 4/10/23 at 1:10 pm and completed on 23 at 1:10 pm.     Underlying rhythm was sinus.     Hrt rate ranged from 45 bpm, maximum heart rate of 171 bmp, averaging 72 bmp.     No significant bradycardia, pauses, Mobitz type II or 3rd degree heart block.     No  atrial fibrillation on this study.     x1 triggered event and x0 diary entries.  These corresponded to NSR.     x0 runs of VT.       x0 runs of SVT.     Rare, <1% of PAC's, atrial couplets, atrial triplets, and PVC's.     No episodes of ventricular bigeminy.     No episodes of ventricular trigeminy.        Mendel Leon, DO        METS/Exercise Tolerance: >4 METS    Hematologic:  - neg hematologic  ROS     Musculoskeletal: Comment: Hip pain      GI/Hepatic: Comment: Chronic abdominal pain since birth of child    (+) GERD (no heartburn today),  esophageal disease (esophagitis),                 Renal/Genitourinary:  - neg Renal ROS     Endo: Comment: Abnormal maternal glucose tolerance, antepartum    (+)               Obesity,       Psychiatric/Substance Use:  - neg psychiatric ROS     Infectious Disease:  - neg infectious disease ROS     Malignancy:  - neg malignancy ROS     Other:      (+)  LMP: pt states no chance of pregnancy when asked, ,         Physical Exam    Airway        Mallampati: III   TM distance: > 3 FB   Neck ROM: full   Mouth opening: > 3 cm    Respiratory Devices and Support         Dental       (+) Minor Abnormalities - some fillings, tiny chips      Cardiovascular          Rhythm and rate: regular and normal     Pulmonary           breath sounds clear to auscultation           OUTSIDE LABS:  CBC:   Lab Results   Component Value Date    WBC 9.4 12/14/2023    WBC 9.2 04/05/2023    HGB 13.1 12/14/2023    HGB 13.3 04/05/2023    HCT 39.4 12/14/2023    HCT 40.3 04/05/2023     12/14/2023     04/05/2023     BMP:   Lab Results   Component Value Date     05/06/2024     12/14/2023    POTASSIUM 4.3 05/06/2024    POTASSIUM 3.3 (L) 12/14/2023    CHLORIDE 105 05/06/2024    CHLORIDE 102 12/14/2023    CO2 26 05/06/2024    CO2 25 12/14/2023    BUN 12.3 05/06/2024    BUN 11.1 12/14/2023    CR 0.60 05/06/2024    CR 0.67 12/14/2023    GLC 99 05/06/2024     (H) 12/14/2023  "    COAGS: No results found for: \"PTT\", \"INR\", \"FIBR\"  POC:   Lab Results   Component Value Date    HCG Negative 12/27/2023     HEPATIC:   Lab Results   Component Value Date    ALBUMIN 4.0 04/05/2023    PROTTOTAL 6.9 04/05/2023    ALT 18 04/05/2023    AST 15 04/05/2023    ALKPHOS 79 04/05/2023    BILITOTAL 0.2 04/05/2023     OTHER:   Lab Results   Component Value Date    A1C 5.8 (H) 05/06/2024    CARMELITA 8.8 05/06/2024    TSH 1.31 04/05/2023       Anesthesia Plan    ASA Status:  2    NPO Status:  NPO Appropriate (2330 last pm; water 0400 sips)    Anesthesia Type: General.     - Airway: LMA   Induction: Intravenous.   Maintenance: Balanced.        Consents    Anesthesia Plan(s) and associated risks, benefits, and realistic alternatives discussed. Questions answered and patient/representative(s) expressed understanding.     - Discussed:     - Discussed with:  Patient      - Extended Intubation/Ventilatory Support Discussed: No.      - Patient is DNR/DNI Status: No     Use of blood products discussed: No .     Postoperative Care            Comments:    Other Comments: 9/10/24    Risks and benefits of MAC anesthetic discussed including dental damage, aspiration, loss of airway, conversion to general anesthetic, CV complications, MI, stroke, death. Pt wishes to proceed.   Discussed risks and benefits with patient for general anesthesia including sore throat, nausea, vomiting, aspiration, dental damage, loss of airway, CV complications, stroke, MI, death. Pt wishes to proceed.               DIMPLE MEAD CRNA    I have reviewed the pertinent notes and labs in the chart from the past 30 days and (re)examined the patient.  Any updates or changes from those notes are reflected in this note.                  "

## 2024-07-31 ENCOUNTER — TELEPHONE (OUTPATIENT)
Dept: OTOLARYNGOLOGY | Facility: OTHER | Age: 39
End: 2024-07-31

## 2024-07-31 NOTE — TELEPHONE ENCOUNTER
----- Message from Shiela LEE sent at 7/31/2024  1:23 PM CDT -----  Pt canceled pre op yesterday with Dr Mohr.  Can you please reach out to patient as she has no pre op appt coming up.  Thank you, Shiela

## 2024-08-07 ENCOUNTER — ANESTHESIA (OUTPATIENT)
Dept: SURGERY | Facility: HOSPITAL | Age: 39
End: 2024-08-07
Payer: COMMERCIAL

## 2024-09-09 NOTE — PROGRESS NOTES
Preoperative Evaluation  [facility] [place]  [address]  [place] [address]  Phone: [phone]  Primary Provider: Negin Mohr MD  Pre-op Performing Provider: Negin Mohr MD  Sep 10, 2024             9/10/2024   Surgical Information   What procedure is being done? LEFT MYRINGOTOMY WITH TUBE INSERTION   Facility or Hospital where procedure/surgery will be performed: [facility] [place]   Who is doing the procedure / surgery? Dr Goldberg   Date of surgery / procedure: 9/25/2024   Time of surgery / procedure: 8:20AM   Where do you plan to recover after surgery? at home with family      Fax number for surgical facility: Note does not need to be faxed, will be available electronically in Epic.    Assessment & Plan     The proposed surgical procedure is considered LOW risk.    Preop general physical exam  No concerning lab or exam findings  - Basic metabolic panel; Future  - CBC with platelets; Future  - Basic metabolic panel  - CBC with platelets    COME (chronic otitis media with effusion), left  Reason for the procedure  Opal will hold her ibu 24hrs before procedure        Risks and Recommendations  The patient has the following additional risks and recommendations for perioperative complications:   - No identified additional risk factors other than previously addressed    Preoperative Medication Instructions  Antiplatelet or Anticoagulation Medication Instructions   - Patient is on no antiplatelet or anticoagulation medications.    Additional Medication Instructions  Patient is on no additional chronic medications  No ibuprofen 24 hrs before your procedure    Recommendation  Approval given to proceed with proposed procedure, without further diagnostic evaluation.    Pedrito Joseph is a 39 year old, presenting for the following:  Pre-Op Exam          9/10/2024     8:07 AM   Additional Questions   Roomed by Gordy Gutiérrez lpn   Accompanied by self     HPI related to upcoming  procedure: Opal has been having issues with recurrent otitis media. She is s/p right myringotomy with tube placement. CT temporal bone (7/8/2024) concerning for left tegmen erosion and compete opacification of left middle ear and mastoid. Opal will be undergoing the above procedure.                 9/10/2024   Pre-Op Questionnaire   Have you ever had a heart attack or stroke? No   Have you ever had surgery on your heart or blood vessels, such as a stent placement, a coronary artery bypass, or surgery on an artery in your head, neck, heart, or legs? No   Do you have chest pain with activity? No   Do you have a history of heart failure? No   Do you currently have a cold, bronchitis or symptoms of other infection? No   Do you have a cough, shortness of breath, or wheezing? No   Do you or anyone in your family have previous history of blood clots? No   Do you or does anyone in your family have a serious bleeding problem such as prolonged bleeding following surgeries or cuts? No   Have you ever had problems with anemia or been told to take iron pills? No   Have you had any abnormal blood loss such as black, tarry or bloody stools, or abnormal vaginal bleeding? No   Have you ever had a blood transfusion? No   Are you willing to have a blood transfusion if it is medically needed before, during, or after your surgery? Yes   Have you or any of your relatives ever had problems with anesthesia? No   Do you have sleep apnea, excessive snoring or daytime drowsiness? No   Do you have any artifical heart valves or other implanted medical devices like a pacemaker, defibrillator, or continuous glucose monitor? No   Do you have artificial joints? No   Are you allergic to latex? No      Health Care Directive  Patient does not have a Health Care Directive or Living Will: Discussed advance care planning with patient; however, patient declined at this time.    Preoperative Review of    reviewed - no record of controlled  substances prescribed.      Status of Chronic Conditions:  See problem list for active medical problems.  Problems all longstanding and stable, except as noted/documented.  See ROS for pertinent symptoms related to these conditions.    Patient Active Problem List    Diagnosis Date Noted    Bilateral hip pain 2023     Priority: Medium    Granuloma annulare 2017     Priority: Medium    Non morbid obesity, unspecified obesity type 2017     Priority: Medium    Surgery, elective 2016     Priority: Medium     (spontaneous vaginal delivery) 2016     Priority: Medium     VTX/VTX Twins   delivery Ambrose 16  Intact  Boy Waldron  Girl Flor  Desires BTO      Oligohydramnios 2016     Priority: Medium    GBS (group B Streptococcus carrier), +RV culture, currently pregnant 2016     Priority: Medium    ACP (advance care planning) 2016     Priority: Medium     Advance Care Planning 2016: ACP Review of Chart / Resources Provided:  Reviewed chart for advance care plan.  Opal Henderson has been provided information and resources to begin or update their advance care plan.  Added by Kadie Mcdaniel            Abnormal maternal glucose tolerance, antepartum 2016     Priority: Medium     GDM-Diabetes center referral      Encounter for sterilization 05/10/2016     Priority: Medium     Desires pp BTO.  Counseling and federal sterilization consent forms done.        Dichorionic diamniotic twin gestation 2016     Priority: Medium     Di-Di on early US  Suprise  Serial US growth  Nml Quad  Transfer of care from [facility] Dr. Barrios  Nml level 2 US  BC-Desires BTO  Peds:  Dr. Barrios  RHIG and TDAP done 28 weeks      Needle stick injury 2013     Priority: Medium     Overview:   [facility], primary blood negative per lab testing, 35wk exposure      Dysplasia of cervix, low grade (AMEE 1) 10/23/2012     Priority: Medium      Past Medical History:   Diagnosis  Date    HGSIL on Pap smear of cervix 05/10/2005    Pap smear of cervix with ASCUS, cannot exclude HGSIL 10/16/2012    Reflux esophagitis 04/07/2009     Past Surgical History:   Procedure Laterality Date    EXCISE LESION CHEEK Left 12/27/2023    Procedure: EXCISION LEFT CHEEK LESION, with incision measuring 2 cm, FROZENS;  Surgeon: Mercy Goldberg MD;  Location: [facility] OR    HERNIORRHAPHY UMBILICAL N/A 8/19/2016    Procedure: HERNIORRHAPHY UMBILICAL;  Surgeon: Mirza Baker MD;  Location: [facility] OR    LAPAROTOMY MINI, TUBAL LIGATION (POST PARTUM), COMBINED Bilateral 8/19/2016    Procedure: COMBINED LAPAROTOMY MINI, TUBAL LIGATION (POST PARTUM);  Surgeon: Mirza Baker MD;  Location: [facility] OR    PE TUBES  pt 10 years old     Current Outpatient Medications   Medication Sig Dispense Refill    budesonide (PULMICORT) 0.5 MG/2ML neb solution Make 240 cc Saqib med sinus irrigation Mix 2 ml vial of budesonide 0.5 mg Rinse 1-2 times daily. (Patient not taking: Reported on 6/27/2024) 120 mL 1    fluticasone (FLONASE) 50 MCG/ACT nasal spray Spray 1 spray into both nostrils daily (Patient not taking: Reported on 6/27/2024) 16 g 1       No Known Allergies     Social History     Tobacco Use    Smoking status: Never     Passive exposure: Never    Smokeless tobacco: Never   Substance Use Topics    Alcohol use: Yes     Comment: socially       History   Drug Use No           Review of Systems   Constitutional:  Negative for chills and fever.   HENT:  Positive for ear pain. Negative for congestion.    Respiratory:  Negative for shortness of breath.    Cardiovascular:  Negative for chest pain and palpitations.   Gastrointestinal:  Negative for abdominal pain.   Psychiatric/Behavioral:  Negative for dysphoric mood.          Objective    /84 (BP Location: Right arm, Patient Position: Sitting, Cuff Size: Adult Regular)   Pulse 57   Temp 97.9  F (36.6  C) (Tympanic)   Resp 16   Wt 74.2 kg (163 lb 8 oz)   LMP 09/09/2024 (Exact Date)    "SpO2 96%   BMI 33.02 kg/m     Estimated body mass index is 33.02 kg/m  as calculated from the following:    Height as of 6/27/24: 1.499 m (4' 11\").    Weight as of this encounter: 74.2 kg (163 lb 8 oz).  Physical Exam  Constitutional:       General: She is not in acute distress.     Appearance: She is well-developed.   HENT:      Head: Normocephalic and atraumatic.      Right Ear: Hearing and tympanic membrane normal. A PE tube is present.      Left Ear: Hearing and tympanic membrane normal.      Mouth/Throat:      Mouth: Mucous membranes are moist.      Pharynx: No oropharyngeal exudate.   Eyes:      Extraocular Movements: Extraocular movements intact.      Conjunctiva/sclera: Conjunctivae normal.   Neck:      Thyroid: No thyromegaly.   Cardiovascular:      Rate and Rhythm: Normal rate and regular rhythm.      Pulses: Normal pulses.      Heart sounds: Normal heart sounds. No murmur heard.  Pulmonary:      Effort: Pulmonary effort is normal. No respiratory distress.      Breath sounds: Normal breath sounds. No wheezing or rales.   Abdominal:      General: Bowel sounds are normal. There is no distension.      Palpations: Abdomen is soft.      Tenderness: There is abdominal tenderness (chronic since birth of her twins) in the epigastric area. There is no guarding.   Musculoskeletal:         General: Normal range of motion.      Cervical back: Normal range of motion and neck supple.      Right lower leg: No edema.      Left lower leg: No edema.   Lymphadenopathy:      Cervical: No cervical adenopathy.   Skin:     General: Skin is dry.   Neurological:      Mental Status: She is alert.   Psychiatric:         Mood and Affect: Mood normal.         Recent Labs   Lab Test 05/06/24  0845 12/14/23  0812   HGB  --  13.1   PLT  --  257    138   POTASSIUM 4.3 3.3*   CR 0.60 0.67   A1C 5.8*  --         Diagnostics  Recent Results (from the past 24 hour(s))   Basic metabolic panel    Collection Time: 09/10/24  8:17 AM "   Result Value Ref Range    Sodium 138 135 - 145 mmol/L    Potassium 4.2 3.4 - 5.3 mmol/L    Chloride 105 98 - 107 mmol/L    Carbon Dioxide (CO2) 23 22 - 29 mmol/L    Anion Gap 10 7 - 15 mmol/L    Urea Nitrogen 14.5 6.0 - 20.0 mg/dL    Creatinine 0.78 0.51 - 0.95 mg/dL    GFR Estimate >90 >60 mL/min/1.73m2    Calcium 9.2 8.8 - 10.4 mg/dL    Glucose 106 (H) 70 - 99 mg/dL   CBC with platelets    Collection Time: 09/10/24  8:17 AM   Result Value Ref Range    WBC Count 8.8 4.0 - 11.0 10e3/uL    RBC Count 4.61 3.80 - 5.20 10e6/uL    Hemoglobin 13.2 11.7 - 15.7 g/dL    Hematocrit 40.2 35.0 - 47.0 %    MCV 87 78 - 100 fL    MCH 28.6 26.5 - 33.0 pg    MCHC 32.8 31.5 - 36.5 g/dL    RDW 13.4 10.0 - 15.0 %    Platelet Count 279 150 - 450 10e3/uL        S/p tubal ligation     No EKG required, no history of coronary heart disease, significant arrhythmia, peripheral arterial disease or other structural heart disease.    Revised Cardiac Risk Index (RCRI)  The patient has the following serious cardiovascular risks for perioperative complications:   - No serious cardiac risks = 0 points     RCRI Interpretation: 0 points: Class I (very low risk - 0.4% complication rate)       The longitudinal plan of care for the diagnosis(es)/condition(s) as documented were addressed during this visit. Due to the added complexity in care, I will continue to support Opal in the subsequent management and with ongoing continuity of care.    Signed Electronically by: Negin Mohr MD  A copy of this evaluation report is provided to the requesting physician.

## 2024-09-09 NOTE — PATIENT INSTRUCTIONS

## 2024-09-10 ENCOUNTER — OFFICE VISIT (OUTPATIENT)
Dept: FAMILY MEDICINE | Facility: OTHER | Age: 39
End: 2024-09-10
Attending: FAMILY MEDICINE
Payer: COMMERCIAL

## 2024-09-10 VITALS
SYSTOLIC BLOOD PRESSURE: 120 MMHG | WEIGHT: 163.5 LBS | HEART RATE: 57 BPM | RESPIRATION RATE: 16 BRPM | OXYGEN SATURATION: 96 % | TEMPERATURE: 97.9 F | BODY MASS INDEX: 33.02 KG/M2 | DIASTOLIC BLOOD PRESSURE: 84 MMHG

## 2024-09-10 DIAGNOSIS — Z01.818 PREOP GENERAL PHYSICAL EXAM: Primary | ICD-10-CM

## 2024-09-10 DIAGNOSIS — H65.492 COME (CHRONIC OTITIS MEDIA WITH EFFUSION), LEFT: ICD-10-CM

## 2024-09-10 LAB
ANION GAP SERPL CALCULATED.3IONS-SCNC: 10 MMOL/L (ref 7–15)
BUN SERPL-MCNC: 14.5 MG/DL (ref 6–20)
CALCIUM SERPL-MCNC: 9.2 MG/DL (ref 8.8–10.4)
CHLORIDE SERPL-SCNC: 105 MMOL/L (ref 98–107)
CREAT SERPL-MCNC: 0.78 MG/DL (ref 0.51–0.95)
EGFRCR SERPLBLD CKD-EPI 2021: >90 ML/MIN/1.73M2
ERYTHROCYTE [DISTWIDTH] IN BLOOD BY AUTOMATED COUNT: 13.4 % (ref 10–15)
GLUCOSE SERPL-MCNC: 106 MG/DL (ref 70–99)
HCO3 SERPL-SCNC: 23 MMOL/L (ref 22–29)
HCT VFR BLD AUTO: 40.2 % (ref 35–47)
HGB BLD-MCNC: 13.2 G/DL (ref 11.7–15.7)
MCH RBC QN AUTO: 28.6 PG (ref 26.5–33)
MCHC RBC AUTO-ENTMCNC: 32.8 G/DL (ref 31.5–36.5)
MCV RBC AUTO: 87 FL (ref 78–100)
PLATELET # BLD AUTO: 279 10E3/UL (ref 150–450)
POTASSIUM SERPL-SCNC: 4.2 MMOL/L (ref 3.4–5.3)
RBC # BLD AUTO: 4.61 10E6/UL (ref 3.8–5.2)
SODIUM SERPL-SCNC: 138 MMOL/L (ref 135–145)
WBC # BLD AUTO: 8.8 10E3/UL (ref 4–11)

## 2024-09-10 PROCEDURE — G2211 COMPLEX E/M VISIT ADD ON: HCPCS | Performed by: FAMILY MEDICINE

## 2024-09-10 PROCEDURE — 99213 OFFICE O/P EST LOW 20 MIN: CPT | Performed by: FAMILY MEDICINE

## 2024-09-10 PROCEDURE — 36415 COLL VENOUS BLD VENIPUNCTURE: CPT | Performed by: FAMILY MEDICINE

## 2024-09-10 PROCEDURE — 85027 COMPLETE CBC AUTOMATED: CPT | Performed by: FAMILY MEDICINE

## 2024-09-10 PROCEDURE — 80048 BASIC METABOLIC PNL TOTAL CA: CPT | Performed by: FAMILY MEDICINE

## 2024-09-10 ASSESSMENT — ENCOUNTER SYMPTOMS
CHILLS: 0
DYSPHORIC MOOD: 0
SHORTNESS OF BREATH: 0
ABDOMINAL PAIN: 0
FEVER: 0
PALPITATIONS: 0

## 2024-09-10 ASSESSMENT — PAIN SCALES - GENERAL: PAINLEVEL: NO PAIN (0)

## 2024-09-23 NOTE — DISCHARGE INSTRUCTIONS
After Anesthesia (Sleep Medicine)  What should I do after anesthesia?  You should rest and relax for the next 24 hours. Avoid risky or difficult (strenuous) activity. A responsible adult should stay with you overnight.  Don't drive or use any heavy equipment for 24 hours. Even if you feel normal, your reactions may be affected by the sleep medicine given to you.  Don't drink alcohol or make any important decisions for 24 hours.  Slowly get back to your regular diet, as you feel able.  How should I expect to feel?  It's normal to feel dizzy, light-headed, or faint for up to a full day after anesthesia or while taking pain medicine. If this happens:   Sit down for a few minutes before standing.  Have someone help you when you get up to walk or use the bathroom.  If you have nausea (feel sick to your stomach) or vomit (throw up):   Drink clear liquids (such as apple juice, ginger ale, broth, or 7UP) until you feel better.  If you feel sick to your stomach, or you keep vomiting for 24 hours, please call the doctor.  What else should I know?  You might have a dry mouth, sore throat, muscle aches, or trouble sleeping. These should go away after 24 hours.  Please contact your doctor if you have any other symptoms that concern you, such as fever, pain, bleeding, fluid drainage, swelling, or headache, or if it's been over 8 to 10 hours and you still aren't able to pee (urinate).  If you have a history of sleep apnea, it's very important to use your CPAP machine for the next 24 hours when you nap or sleep.   For informational purposes only. Not to replace the advice of your health care provider. Copyright   2023 [employer] All rights reserved. Clinically reviewed by Fredy Izquierdo MD. [employer] [id number] - REV 09/23.    Instructions for Myringotomy Tubes ( Ear Tubes)    Recovery - The placement of ear tubes is a brief operation, and therefore the recovery from the anesthetic is usually less than a day.   "However, in young children the sleep patterns, feeding, and behavior can be altered for several days.  Try to return to the daily routine as soon as possible and this issue will resolve without problems.  There are no restrictions to diet or activity after ear tube placement.    Medications - Children and adults can return to all preoperative medications after this procedure, including blood thinners. Pain medication may have been sent home with you, but a vast majority of the time, over the counter Tylenol or ibuprofen (advil) I sufficient.     Finish the ear drops prescribed to you today as directed.    Complications - A low grade fever (up to 100 degrees ) is not unusual in the day after tubes are placed.  Treat this with cool wash cloths to the forehead and Tylenol.  If the fever is higher, or does not respond to medication, call the Doctor's office or call service after hours.  A small amount of bloody drainage can occur for a day or two after ear tubes, and is perfectly normal, continue the ear drops as directed and it will clear up.    Water Precautions - Recent clinical research has shown that absolute water precautions are not always necessary.  Ear plugs or water head bands are not necessary unless the ear is actively draining, or if your child does not like the sensation of water in the ear.    Follow up - Approximately 1 month after the tubes are placed I like to examine the ears to make sure there are no signs of complications, which are extremely rare.  You should already have an appointment in 1 month with ENT PA and audiology.  If not, call our office at [phone].  In some unusual cases the ears \"reject\" the tubes.  Depending on the situation, a hearing test may or may not be performed at that time.  Afterwards, follow up is done every 6 months, but of course earlier if there are any issues or problems.    Advantages of Tubes - After ear tube placement, there are certain benefits from having a direct " communication of the middle ear space with the ear canal. Another advantage is that with tubes in place, the ears automatically adjust to changes in atmospheric pressure ( such as in airplanes or elevation ).  In other words, if the tubes are open the ears will not hurt or pop!    If there are any questions or issues with the above, or if there are other issues that concern you, always feel free to call the clinic and I am happy to speak with you as soon as I can.  Mercy Goldberg D.O.    Otolaryngology/Head and Neck Surgery/ Allergy      [phone] extension [phone]

## 2024-09-24 ENCOUNTER — HOSPITAL ENCOUNTER (OUTPATIENT)
Facility: HOSPITAL | Age: 39
Discharge: HOME | End: 2024-09-24
Attending: OTOLARYNGOLOGY | Admitting: OTOLARYNGOLOGY
Payer: COMMERCIAL

## 2024-09-24 VITALS
DIASTOLIC BLOOD PRESSURE: 84 MMHG | SYSTOLIC BLOOD PRESSURE: 133 MMHG | HEART RATE: 64 BPM | RESPIRATION RATE: 16 BRPM | BODY MASS INDEX: 31.25 KG/M2 | OXYGEN SATURATION: 94 % | WEIGHT: 155 LBS | TEMPERATURE: 97.2 F | HEIGHT: 59 IN

## 2024-09-24 DIAGNOSIS — Z96.22 S/P MYRINGOTOMY WITH INSERTION OF TUBE: Primary | ICD-10-CM

## 2024-09-24 PROCEDURE — 258N000003 HC RX IP 258 OP 636: Performed by: NURSE ANESTHETIST, CERTIFIED REGISTERED

## 2024-09-24 PROCEDURE — 250N000009 HC RX 250: Performed by: OTOLARYNGOLOGY

## 2024-09-24 PROCEDURE — 710N000010 HC RECOVERY PHASE 1, LEVEL 2, PER MIN: Performed by: OTOLARYNGOLOGY

## 2024-09-24 PROCEDURE — 999N000141 HC STATISTIC PRE-PROCEDURE NURSING ASSESSMENT: Performed by: OTOLARYNGOLOGY

## 2024-09-24 PROCEDURE — 69436 CREATE EARDRUM OPENING: CPT | Performed by: NURSE ANESTHETIST, CERTIFIED REGISTERED

## 2024-09-24 PROCEDURE — 250N000011 HC RX IP 250 OP 636: Performed by: OTOLARYNGOLOGY

## 2024-09-24 PROCEDURE — 250N000009 HC RX 250: Performed by: NURSE ANESTHETIST, CERTIFIED REGISTERED

## 2024-09-24 PROCEDURE — 272N000001 HC OR GENERAL SUPPLY STERILE: Performed by: OTOLARYNGOLOGY

## 2024-09-24 PROCEDURE — 250N000011 HC RX IP 250 OP 636: Performed by: NURSE ANESTHETIST, CERTIFIED REGISTERED

## 2024-09-24 PROCEDURE — 250N000025 HC SEVOFLURANE, PER MIN: Performed by: OTOLARYNGOLOGY

## 2024-09-24 PROCEDURE — 360N000076 HC SURGERY LEVEL 3, PER MIN: Performed by: OTOLARYNGOLOGY

## 2024-09-24 PROCEDURE — 370N000017 HC ANESTHESIA TECHNICAL FEE, PER MIN: Performed by: OTOLARYNGOLOGY

## 2024-09-24 PROCEDURE — 69436 CREATE EARDRUM OPENING: CPT | Mod: 50 | Performed by: OTOLARYNGOLOGY

## 2024-09-24 PROCEDURE — 710N000012 HC RECOVERY PHASE 2, PER MINUTE: Performed by: OTOLARYNGOLOGY

## 2024-09-24 RX ORDER — FENTANYL CITRATE 50 UG/ML
INJECTION, SOLUTION INTRAMUSCULAR; INTRAVENOUS PRN
Status: DISCONTINUED | OUTPATIENT
Start: 2024-09-24 | End: 2024-09-24

## 2024-09-24 RX ORDER — DEXAMETHASONE SODIUM PHOSPHATE 4 MG/ML
4 INJECTION, SOLUTION INTRA-ARTICULAR; INTRALESIONAL; INTRAMUSCULAR; INTRAVENOUS; SOFT TISSUE
Status: DISCONTINUED | OUTPATIENT
Start: 2024-09-24 | End: 2024-09-24 | Stop reason: HOSPADM

## 2024-09-24 RX ORDER — PROPOFOL 10 MG/ML
INJECTION, EMULSION INTRAVENOUS PRN
Status: DISCONTINUED | OUTPATIENT
Start: 2024-09-24 | End: 2024-09-24

## 2024-09-24 RX ORDER — ONDANSETRON 4 MG/1
4 TABLET, ORALLY DISINTEGRATING ORAL EVERY 30 MIN PRN
Status: DISCONTINUED | OUTPATIENT
Start: 2024-09-24 | End: 2024-09-24 | Stop reason: HOSPADM

## 2024-09-24 RX ORDER — NALOXONE HYDROCHLORIDE 0.4 MG/ML
0.1 INJECTION, SOLUTION INTRAMUSCULAR; INTRAVENOUS; SUBCUTANEOUS
Status: DISCONTINUED | OUTPATIENT
Start: 2024-09-24 | End: 2024-09-24 | Stop reason: HOSPADM

## 2024-09-24 RX ORDER — ONDANSETRON 2 MG/ML
INJECTION INTRAMUSCULAR; INTRAVENOUS PRN
Status: DISCONTINUED | OUTPATIENT
Start: 2024-09-24 | End: 2024-09-24

## 2024-09-24 RX ORDER — FENTANYL CITRATE 50 UG/ML
25 INJECTION, SOLUTION INTRAMUSCULAR; INTRAVENOUS EVERY 5 MIN PRN
Status: DISCONTINUED | OUTPATIENT
Start: 2024-09-24 | End: 2024-09-24 | Stop reason: HOSPADM

## 2024-09-24 RX ORDER — LABETALOL HYDROCHLORIDE 5 MG/ML
10 INJECTION, SOLUTION INTRAVENOUS
Status: DISCONTINUED | OUTPATIENT
Start: 2024-09-24 | End: 2024-09-24 | Stop reason: HOSPADM

## 2024-09-24 RX ORDER — LIDOCAINE HYDROCHLORIDE 20 MG/ML
INJECTION, SOLUTION INFILTRATION; PERINEURAL PRN
Status: DISCONTINUED | OUTPATIENT
Start: 2024-09-24 | End: 2024-09-24

## 2024-09-24 RX ORDER — DEXAMETHASONE SODIUM PHOSPHATE 4 MG/ML
INJECTION, SOLUTION INTRA-ARTICULAR; INTRALESIONAL; INTRAMUSCULAR; INTRAVENOUS; SOFT TISSUE PRN
Status: DISCONTINUED | OUTPATIENT
Start: 2024-09-24 | End: 2024-09-24

## 2024-09-24 RX ORDER — ONDANSETRON 2 MG/ML
4 INJECTION INTRAMUSCULAR; INTRAVENOUS EVERY 30 MIN PRN
Status: DISCONTINUED | OUTPATIENT
Start: 2024-09-24 | End: 2024-09-24 | Stop reason: HOSPADM

## 2024-09-24 RX ORDER — DEXAMETHASONE SODIUM PHOSPHATE 10 MG/ML
4 INJECTION, SOLUTION INTRAMUSCULAR; INTRAVENOUS
Status: DISCONTINUED | OUTPATIENT
Start: 2024-09-24 | End: 2024-09-24 | Stop reason: HOSPADM

## 2024-09-24 RX ORDER — SODIUM CHLORIDE, SODIUM LACTATE, POTASSIUM CHLORIDE, CALCIUM CHLORIDE 600; 310; 30; 20 MG/100ML; MG/100ML; MG/100ML; MG/100ML
INJECTION, SOLUTION INTRAVENOUS CONTINUOUS
Status: DISCONTINUED | OUTPATIENT
Start: 2024-09-24 | End: 2024-09-24 | Stop reason: HOSPADM

## 2024-09-24 RX ORDER — ALBUTEROL SULFATE 0.83 MG/ML
2.5 SOLUTION RESPIRATORY (INHALATION) EVERY 4 HOURS PRN
Status: DISCONTINUED | OUTPATIENT
Start: 2024-09-24 | End: 2024-09-24 | Stop reason: HOSPADM

## 2024-09-24 RX ORDER — EPINEPHRINE 1 MG/ML
INJECTION, SOLUTION, CONCENTRATE INTRAVENOUS PRN
Status: DISCONTINUED | OUTPATIENT
Start: 2024-09-24 | End: 2024-09-24 | Stop reason: HOSPADM

## 2024-09-24 RX ORDER — HYDROMORPHONE HYDROCHLORIDE 1 MG/ML
0.2 INJECTION, SOLUTION INTRAMUSCULAR; INTRAVENOUS; SUBCUTANEOUS EVERY 5 MIN PRN
Status: DISCONTINUED | OUTPATIENT
Start: 2024-09-24 | End: 2024-09-24 | Stop reason: HOSPADM

## 2024-09-24 RX ORDER — LIDOCAINE 40 MG/G
CREAM TOPICAL
Status: DISCONTINUED | OUTPATIENT
Start: 2024-09-24 | End: 2024-09-24 | Stop reason: HOSPADM

## 2024-09-24 RX ORDER — HYDROMORPHONE HYDROCHLORIDE 1 MG/ML
0.4 INJECTION, SOLUTION INTRAMUSCULAR; INTRAVENOUS; SUBCUTANEOUS EVERY 5 MIN PRN
Status: DISCONTINUED | OUTPATIENT
Start: 2024-09-24 | End: 2024-09-24 | Stop reason: HOSPADM

## 2024-09-24 RX ORDER — HYDRALAZINE HYDROCHLORIDE 20 MG/ML
2.5-5 INJECTION INTRAMUSCULAR; INTRAVENOUS EVERY 10 MIN PRN
Status: DISCONTINUED | OUTPATIENT
Start: 2024-09-24 | End: 2024-09-24 | Stop reason: HOSPADM

## 2024-09-24 RX ORDER — OFLOXACIN 3 MG/ML
5 SOLUTION AURICULAR (OTIC) 2 TIMES DAILY
Qty: 5 ML | Refills: 1 | Status: SHIPPED | OUTPATIENT
Start: 2024-09-24 | End: 2024-10-01

## 2024-09-24 RX ORDER — FENTANYL CITRATE 50 UG/ML
50 INJECTION, SOLUTION INTRAMUSCULAR; INTRAVENOUS EVERY 5 MIN PRN
Status: DISCONTINUED | OUTPATIENT
Start: 2024-09-24 | End: 2024-09-24 | Stop reason: HOSPADM

## 2024-09-24 RX ORDER — OFLOXACIN 3 MG/ML
SOLUTION AURICULAR (OTIC) PRN
Status: DISCONTINUED | OUTPATIENT
Start: 2024-09-24 | End: 2024-09-24 | Stop reason: HOSPADM

## 2024-09-24 RX ADMIN — MIDAZOLAM 2 MG: 1 INJECTION INTRAMUSCULAR; INTRAVENOUS at 11:41

## 2024-09-24 RX ADMIN — DEXAMETHASONE SODIUM PHOSPHATE 5 MG: 4 INJECTION, SOLUTION INTRA-ARTICULAR; INTRALESIONAL; INTRAMUSCULAR; INTRAVENOUS; SOFT TISSUE at 11:45

## 2024-09-24 RX ADMIN — FENTANYL CITRATE 25 MCG: 50 INJECTION INTRAMUSCULAR; INTRAVENOUS at 12:00

## 2024-09-24 RX ADMIN — LIDOCAINE HYDROCHLORIDE 40 MG: 20 INJECTION, SOLUTION INFILTRATION; PERINEURAL at 11:43

## 2024-09-24 RX ADMIN — FENTANYL CITRATE 25 MCG: 50 INJECTION INTRAMUSCULAR; INTRAVENOUS at 12:04

## 2024-09-24 RX ADMIN — ONDANSETRON 4 MG: 2 INJECTION INTRAMUSCULAR; INTRAVENOUS at 11:45

## 2024-09-24 RX ADMIN — FENTANYL CITRATE 50 MCG: 50 INJECTION INTRAMUSCULAR; INTRAVENOUS at 11:43

## 2024-09-24 RX ADMIN — SODIUM CHLORIDE, POTASSIUM CHLORIDE, SODIUM LACTATE AND CALCIUM CHLORIDE: 600; 310; 30; 20 INJECTION, SOLUTION INTRAVENOUS at 10:29

## 2024-09-24 RX ADMIN — PROPOFOL 180 MG: 10 INJECTION, EMULSION INTRAVENOUS at 11:43

## 2024-09-24 ASSESSMENT — ACTIVITIES OF DAILY LIVING (ADL)
ADLS_ACUITY_SCORE: 18

## 2024-09-24 NOTE — ANESTHESIA PROCEDURE NOTES
Airway       Patient location during procedure: OR  Staff -        CRNA: Patience Hoskins APRN CRNA       Performed By: CRNAIndications and Patient Condition       Indications for airway management: kely-procedural        Final Airway Details       Final airway type: supraglottic airway    Supraglottic Airway Details        Type: LMA       Brand: I-Gel       LMA size: 4    Post intubation assessment        Ease of procedure: easy

## 2024-09-24 NOTE — ANESTHESIA POSTPROCEDURE EVALUATION
Patient: Opal Henderson    Procedure: Procedure(s):  BILATERAL MYRINGOTOMY WITH T-TUBE INSERTION, RIGHT TUBE REMOVAL       Anesthesia Type:  General    Note:  Disposition: Outpatient   Postop Pain Control: Uneventful            Sign Out: Well controlled pain   PONV: No   Neuro/Psych: Uneventful            Sign Out: Acceptable/Baseline neuro status   Airway/Respiratory: Uneventful            Sign Out: Acceptable/Baseline resp. status   CV/Hemodynamics: Uneventful            Sign Out: Acceptable CV status; No obvious hypovolemia; No obvious fluid overload   Other NRE: NONE   DID A NON-ROUTINE EVENT OCCUR? No       Last vitals:  Vitals Value Taken Time   /86 09/24/24 1230   Temp     Pulse 64 09/24/24 1230   Resp 14 09/24/24 1230   SpO2 94 % 09/24/24 1230       Electronically Signed By: DIMPLE Downing CRNA  September 24, 2024  12:45 PM

## 2024-09-24 NOTE — OR NURSING
Patient agreeable to move to 9/24 as MD had openings on schedule.     
Patient and responsible adult given discharge instructions with no questions regarding instructions. Jose score 20. Pain level 0/10.  Discharged from unit via ambulation. Patient discharged to home.   
Patient reports that she has had a tubal and declines [facility] day of procedure.  
EUS

## 2024-09-24 NOTE — OP NOTE
Pre-op Diagnosis: Bilateral chronic otitis media with effusion, bilateral recurrent acute otitis media  Post-op Diagnosis:  same    Procedure:     Bilateral myringotomy with   T-tube insertion  Removal of nonfunctioning right tube      Surgeon:  Mercy Goldberg D.O.  Anesthesia:  Masked General  EBL:  1 ml  Findings: Filling mucoid effusion left with severe pars tensa retraction and moderate attic retraction, no cholesteatoma  Occluded right T-tube, no effusion right  Complications:  none  Condition:  stable     After surgical consent was obtained, the patient was brought back to the operating room and laid in a comfortable and supine position.  General anesthesia was administered by a member of anesthesia with use of 70% nitrous for 10 minutes.  A time out was taken.  The ears were examined under the operating microscope and through an otologic speculum.  Cerumen was removed from the right external auditory canal.  There is an occluded right T-tube and a large anterior canal overhang.  Adrenaline pledgets were placed for several minutes over the canal wall and removed.  The T-tube was loosened with a pick and removed with an alligator.  I placed a new T-tube through the former anterior inferior myringotomy site in good position.  No effusion is noted.  Floxin drops placed     The left ear was then examined.  There is severe pars tensa and moderate attic retraction with a full effusion.  An anterior-inferior myringotomy was made and I removed filling mucoid effusion with a 5 Chiu.  The ear was gently irrigated and suctioned.  I placed a T-tube through the incision and positioned the tube with a pick the lumen is patent otic drops were placed a cotton ball was placed.     The patient then handed back over to anesthesia, awakened, and brought to recovery room in stable condition.

## 2024-09-24 NOTE — ANESTHESIA CARE TRANSFER NOTE
Patient: Opal Henderson    Procedure: Procedure(s):  BILATERAL MYRINGOTOMY WITH T-TUBE INSERTION, RIGHT TUBE REMOVAL       Diagnosis: Chronic otitis media of left ear with effusion [H65.492]  CHL (conductive hearing loss) [H90.2]  Diagnosis Additional Information: No value filed.    Anesthesia Type:   General     Note:    Oropharynx: oropharynx clear of all foreign objects  Level of Consciousness: drowsy  Oxygen Supplementation: blow-by O2    Independent Airway: airway patency satisfactory and stable  Dentition: dentition unchanged  Vital Signs Stable: post-procedure vital signs reviewed and stable  Report to RN Given: handoff report given  Patient transferred to: PACU    Handoff Report: Identifed the Patient, Identified the Reponsible Provider, Reviewed the pertinent medical history, Discussed the surgical course, Reviewed Intra-OP anesthesia mangement and issues during anesthesia, Set expectations for post-procedure period and Allowed opportunity for questions and acknowledgement of understanding      Vitals:  Vitals Value Taken Time   BP     Temp     Pulse     Resp     SpO2         Electronically Signed By: DIMPLE Pro CRNA  September 24, 2024  12:21 PM

## 2024-10-30 ENCOUNTER — HOSPITAL ENCOUNTER (OUTPATIENT)
Dept: CT IMAGING | Facility: HOSPITAL | Age: 39
Discharge: HOME | End: 2024-10-30
Attending: OTOLARYNGOLOGY | Admitting: OTOLARYNGOLOGY
Payer: COMMERCIAL

## 2024-10-30 DIAGNOSIS — H90.0 CONDUCTIVE HEARING LOSS, BILATERAL: ICD-10-CM

## 2024-10-30 PROCEDURE — 70480 CT ORBIT/EAR/FOSSA W/O DYE: CPT

## 2024-12-04 DIAGNOSIS — H91.93 DECREASED HEARING OF BOTH EARS: Primary | ICD-10-CM

## 2025-05-14 PROBLEM — E78.2 MIXED HYPERLIPIDEMIA: Status: ACTIVE | Noted: 2025-05-14

## 2025-05-14 PROBLEM — R73.03 PREDIABETES: Status: ACTIVE | Noted: 2025-05-14

## 2025-05-15 ENCOUNTER — RESULTS FOLLOW-UP (OUTPATIENT)
Dept: FAMILY MEDICINE | Facility: OTHER | Age: 40
End: 2025-05-15

## 2025-05-15 ENCOUNTER — OFFICE VISIT (OUTPATIENT)
Dept: FAMILY MEDICINE | Facility: OTHER | Age: 40
End: 2025-05-15
Attending: FAMILY MEDICINE
Payer: COMMERCIAL

## 2025-05-15 ENCOUNTER — LAB (OUTPATIENT)
Dept: LAB | Facility: OTHER | Age: 40
End: 2025-05-15
Attending: FAMILY MEDICINE
Payer: COMMERCIAL

## 2025-05-15 VITALS
WEIGHT: 173.9 LBS | RESPIRATION RATE: 16 BRPM | DIASTOLIC BLOOD PRESSURE: 84 MMHG | SYSTOLIC BLOOD PRESSURE: 130 MMHG | TEMPERATURE: 98.8 F | OXYGEN SATURATION: 97 % | HEART RATE: 55 BPM | BODY MASS INDEX: 35.06 KG/M2 | HEIGHT: 59 IN

## 2025-05-15 DIAGNOSIS — R73.03 PREDIABETES: ICD-10-CM

## 2025-05-15 DIAGNOSIS — E78.2 MIXED HYPERLIPIDEMIA: ICD-10-CM

## 2025-05-15 DIAGNOSIS — Z00.00 ROUTINE GENERAL MEDICAL EXAMINATION AT A HEALTH CARE FACILITY: Primary | ICD-10-CM

## 2025-05-15 DIAGNOSIS — Z12.4 SCREENING FOR MALIGNANT NEOPLASM OF CERVIX: ICD-10-CM

## 2025-05-15 LAB
ALBUMIN SERPL BCG-MCNC: 4.2 G/DL (ref 3.5–5.2)
ALP SERPL-CCNC: 97 U/L (ref 40–150)
ALT SERPL W P-5'-P-CCNC: 17 U/L (ref 0–50)
ANION GAP SERPL CALCULATED.3IONS-SCNC: 12 MMOL/L (ref 7–15)
AST SERPL W P-5'-P-CCNC: 14 U/L (ref 0–45)
BILIRUB SERPL-MCNC: 0.2 MG/DL
BUN SERPL-MCNC: 10.5 MG/DL (ref 6–20)
CALCIUM SERPL-MCNC: 9.3 MG/DL (ref 8.8–10.4)
CHLORIDE SERPL-SCNC: 101 MMOL/L (ref 98–107)
CHOLEST SERPL-MCNC: 239 MG/DL
CREAT SERPL-MCNC: 0.67 MG/DL (ref 0.51–0.95)
EGFRCR SERPLBLD CKD-EPI 2021: >90 ML/MIN/1.73M2
EST. AVERAGE GLUCOSE BLD GHB EST-MCNC: 126 MG/DL
FASTING STATUS PATIENT QL REPORTED: NO
FASTING STATUS PATIENT QL REPORTED: NO
GLUCOSE SERPL-MCNC: 97 MG/DL (ref 70–99)
HBA1C MFR BLD: 6 %
HCO3 SERPL-SCNC: 23 MMOL/L (ref 22–29)
HDLC SERPL-MCNC: 52 MG/DL
LDLC SERPL CALC-MCNC: 139 MG/DL
NONHDLC SERPL-MCNC: 187 MG/DL
POTASSIUM SERPL-SCNC: 4 MMOL/L (ref 3.4–5.3)
PROT SERPL-MCNC: 7.7 G/DL (ref 6.4–8.3)
SODIUM SERPL-SCNC: 136 MMOL/L (ref 135–145)
TRIGL SERPL-MCNC: 240 MG/DL

## 2025-05-15 PROCEDURE — 83036 HEMOGLOBIN GLYCOSYLATED A1C: CPT

## 2025-05-15 PROCEDURE — 36415 COLL VENOUS BLD VENIPUNCTURE: CPT

## 2025-05-15 PROCEDURE — 80061 LIPID PANEL: CPT

## 2025-05-15 PROCEDURE — 80053 COMPREHEN METABOLIC PANEL: CPT

## 2025-05-15 SDOH — HEALTH STABILITY: PHYSICAL HEALTH: ON AVERAGE, HOW MANY MINUTES DO YOU ENGAGE IN EXERCISE AT THIS LEVEL?: 0 MIN

## 2025-05-15 SDOH — HEALTH STABILITY: PHYSICAL HEALTH: ON AVERAGE, HOW MANY DAYS PER WEEK DO YOU ENGAGE IN MODERATE TO STRENUOUS EXERCISE (LIKE A BRISK WALK)?: 0 DAYS

## 2025-05-15 ASSESSMENT — PAIN SCALES - GENERAL: PAINLEVEL_OUTOF10: NO PAIN (0)

## 2025-05-15 ASSESSMENT — SOCIAL DETERMINANTS OF HEALTH (SDOH): HOW OFTEN DO YOU GET TOGETHER WITH FRIENDS OR RELATIVES?: ONCE A WEEK

## 2025-05-15 NOTE — PROGRESS NOTES
"Preventive Care Visit  [facility]  Negin Mohr MD, Family Medicine  May 15, 2025      Assessment & Plan     Routine general medical examination at a health care facility  Discussed and updated preventive cares  Repeat wellness visit in one year     Mixed hyperlipidemia  LDL steady at 139. ASCVD risk 1%  - Lipid Profile; Future  - Comprehensive metabolic panel; Future  - no lipid medications      Prediabetes  A1c increased to 6.0 from 5.8  - Hemoglobin A1c; Future  - no diabetic medications yet. Opal is planning on working on diet/exercise, once her boards are done    Screening for malignant neoplasm of cervix  If NILM/HPV negative. Repeat 5 years  - HPV and Gynecologic Cytology Panel - Recommended Age 30 - 65 Years    The longitudinal plan of care for the diagnosis(es)/condition(s) as documented were addressed during this visit. Due to the added complexity in care, I will continue to support Opal in the subsequent management and with ongoing continuity of care.        BMI  Estimated body mass index is 35.12 kg/m  as calculated from the following:    Height as of this encounter: 1.499 m (4' 11\").    Weight as of this encounter: 78.9 kg (173 lb 14.4 oz).       Counseling  Appropriate preventive services were addressed with this patient via screening, questionnaire, or discussion as appropriate for fall prevention, nutrition, physical activity, Tobacco-use cessation, social engagement, weight loss and cognition.  Checklist reviewing preventive services available has been given to the patient.  Reviewed patient's diet, addressing concerns and/or questions.           Pedrito Joseph is a 39 year old, presenting for the following:  Physical and Lipids        5/15/2025     3:17 PM   Additional Questions   Roomed by Esperanza MO   Accompanied by Self          HPI     Hyperlipidemia Follow-Up    Are you regularly taking any medication or supplement to lower your cholesterol?   No  Are you " having muscle aches or other side effects that you think could be caused by your cholesterol lowering medication?  No    Advance Care Planning    Discussed advance care planning with patient; however, patient declined at this time.        5/15/2025   General Health   How would you rate your overall physical health? (!) FAIR   Feel stress (tense, anxious, or unable to sleep) Only a little   (!) STRESS CONCERN      5/15/2025   Nutrition   Three or more servings of calcium each day? Yes   Diet: Regular (no restrictions)   How many servings of fruit and vegetables per day? (!) 2-3   How many sweetened beverages each day? 0-1         5/15/2025   Exercise   Days per week of moderate/strenous exercise 0 days   Average minutes spent exercising at this level 0 min   (!) EXERCISE CONCERN      5/15/2025   Social Factors   Frequency of gathering with friends or relatives Once a week   Worry food won't last until get money to buy more No   Food not last or not have enough money for food? No   Do you have housing? (Housing is defined as stable permanent housing and does not include staying outside in a car, in a tent, in an abandoned building, in an overnight shelter, or couch-surfing.) Yes   Are you worried about losing your housing? No   Lack of transportation? No   Unable to get utilities (heat,electricity)? No         5/15/2025   Dental   Dentist two times every year? Yes         Today's PHQ-2 Score:       5/15/2025     3:15 PM   PHQ-2 ( 1999 [employer])   Q1: Little interest or pleasure in doing things 0   Q2: Feeling down, depressed or hopeless 0   PHQ-2 Score 0    Q1: Little interest or pleasure in doing things Not at all   Q2: Feeling down, depressed or hopeless Not at all   PHQ-2 Score 0       Patient-reported           5/15/2025   Substance Use   Alcohol more than 3/day or more than 7/wk No   Do you use any other substances recreationally? No     Social History     Tobacco Use    Smoking status: Never     Passive exposure:  "Never    Smokeless tobacco: Never   Vaping Use    Vaping status: Never Used   Substance Use Topics    Alcohol use: Yes     Comment: socially    Drug use: No          Mammogram Screening - Patient under 40 years of age: Routine Mammogram Screening not recommended.         5/15/2025   STI Screening   New sexual partner(s) since last STI/HIV test? No     History of abnormal Pap smear: No - age 30- 64 PAP with HPV every 5 years recommended        4/24/2019     8:39 AM 3/3/2016    12:00 AM   PAP / HPV   PAP (Historical)  negative    PAP-ABSTRACT See Scanned Document         # Wellness:  - Pap: due - update today   - Mammogram: no FHx  - STDs:  - Contraception: s/p tubal  - Immunizations: COVID (declines)  - Lipids: LDL (5/6/2024) 136 with h/o 163, updating today   - Dexa scan: NA  - Colon cancer screening: no FHx  - Lung cancer screening: never smoker  - AAA screening:     Wt Readings from Last 4 Encounters:   05/15/25 78.9 kg (173 lb 14.4 oz)   09/24/24 70.3 kg (155 lb)   09/10/24 74.2 kg (163 lb 8 oz)   06/27/24 70.3 kg (155 lb)     # periods  - have shorten for the past two years. Bleed for 3 to 4 day  - periods are regular   - does not want any more kiddos       Reviewed and updated as needed this visit by Provider   Tobacco  Allergies  Meds  Problems  Med Hx  Surg Hx  Fam Hx                  Review of Systems  Constitutional, HEENT, cardiovascular, pulmonary, gi and gu systems are negative, except as otherwise noted.     Objective    Exam  /84 (BP Location: Right arm, Patient Position: Sitting, Cuff Size: Adult Regular)   Pulse 55   Temp 98.8  F (37.1  C) (Tympanic)   Resp 16   Ht 1.499 m (4' 11\")   Wt 78.9 kg (173 lb 14.4 oz)   LMP 05/08/2025 (Approximate)   SpO2 97%   BMI 35.12 kg/m     Estimated body mass index is 35.12 kg/m  as calculated from the following:    Height as of this encounter: 1.499 m (4' 11\").    Weight as of this encounter: 78.9 kg (173 lb 14.4 oz).    Physical " Exam  Constitutional:       General: She is not in acute distress.     Appearance: She is well-developed.   HENT:      Head: Normocephalic and atraumatic.      Right Ear: Hearing and tympanic membrane normal.      Left Ear: Hearing and tympanic membrane normal.      Mouth/Throat:      Mouth: Mucous membranes are moist.      Pharynx: No oropharyngeal exudate.   Eyes:      Extraocular Movements: Extraocular movements intact.      Conjunctiva/sclera: Conjunctivae normal.   Neck:      Thyroid: No thyromegaly.   Cardiovascular:      Rate and Rhythm: Normal rate and regular rhythm.      Pulses: Normal pulses.      Heart sounds: Normal heart sounds. No murmur heard.  Pulmonary:      Effort: Pulmonary effort is normal. No respiratory distress.      Breath sounds: Normal breath sounds. No wheezing or rales.   Abdominal:      General: Bowel sounds are normal. There is no distension.      Palpations: Abdomen is soft.      Tenderness: There is no abdominal tenderness. There is no guarding.   Genitourinary:     Labia:         Right: No rash or tenderness.         Left: No rash or tenderness.       Vagina: Vaginal discharge present. No erythema, tenderness or bleeding.      Cervix: No discharge, friability, lesion, erythema or cervical bleeding.   Musculoskeletal:         General: Normal range of motion.      Cervical back: Normal range of motion and neck supple.      Right lower leg: No edema.      Left lower leg: No edema.   Lymphadenopathy:      Cervical: No cervical adenopathy.   Skin:     General: Skin is dry.   Neurological:      Mental Status: She is alert.   Psychiatric:         Mood and Affect: Mood normal.         Results for orders placed or performed in visit on 05/15/25   Lipid Profile     Status: Abnormal   Result Value Ref Range    Cholesterol 239 (H) <200 mg/dL    Triglycerides 240 (H) <150 mg/dL    Direct Measure HDL 52 >=50 mg/dL    LDL Cholesterol Calculated 139 (H) <100 mg/dL    Non HDL Cholesterol 187 (H)  <130 mg/dL    Patient Fasting > 8hrs? No     Narrative    Cholesterol  Desirable: < 200 mg/dL  Borderline High: 200 - 239 mg/dL  High: >= 240 mg/dL    Triglycerides  Normal: < 150 mg/dL  Borderline High: 150 - 199 mg/dL  High: 200-499 mg/dL  Very High: >= 500 mg/dL    Direct Measure HDL  Female: >= 50 mg/dL   Male: >= 40 mg/dL    LDL Cholesterol  Desirable: < 100 mg/dL  Above Desirable: 100 - 129 mg/dL   Borderline High: 130 - 159 mg/dL   High:  160 - 189 mg/dL   Very High: >= 190 mg/dL    Non HDL Cholesterol  Desirable: < 130 mg/dL  Above Desirable: 130 - 159 mg/dL  Borderline High: 160 - 189 mg/dL  High: 190 - 219 mg/dL  Very High: >= 220 mg/dL   Hemoglobin A1c     Status: Abnormal   Result Value Ref Range    Estimated Average Glucose 126 (H) <117 mg/dL    Hemoglobin A1C 6.0 (H) <5.7 %   Comprehensive metabolic panel     Status: None   Result Value Ref Range    Sodium 136 135 - 145 mmol/L    Potassium 4.0 3.4 - 5.3 mmol/L    Carbon Dioxide (CO2) 23 22 - 29 mmol/L    Anion Gap 12 7 - 15 mmol/L    Urea Nitrogen 10.5 6.0 - 20.0 mg/dL    Creatinine 0.67 0.51 - 0.95 mg/dL    GFR Estimate >90 >60 mL/min/1.73m2    Calcium 9.3 8.8 - 10.4 mg/dL    Chloride 101 98 - 107 mmol/L    Glucose 97 70 - 99 mg/dL    Alkaline Phosphatase 97 40 - 150 U/L    AST 14 0 - 45 U/L    ALT 17 0 - 50 U/L    Protein Total 7.7 6.4 - 8.3 g/dL    Albumin 4.2 3.5 - 5.2 g/dL    Bilirubin Total 0.2 <=1.2 mg/dL    Patient Fasting > 8hrs? No            Signed Electronically by: Negin Mohr MD

## 2025-05-15 NOTE — PATIENT INSTRUCTIONS
Patient Education   Preventive Care Advice   This is general advice given by our system to help you stay healthy. However, your care team may have specific advice just for you. Please talk to your care team about your preventive care needs.  Nutrition  Eat 5 or more servings of fruits and vegetables each day.  Try wheat bread, brown rice and whole grain pasta (instead of white bread, rice, and pasta).  Get enough calcium and vitamin D. Check the label on foods and aim for 100% of the RDA (recommended daily allowance).  Lifestyle  Exercise at least 150 minutes each week  (30 minutes a day, 5 days a week).  Do muscle strengthening activities 2 days a week. These help control your weight and prevent disease.  No smoking.  Wear sunscreen to prevent skin cancer.  Have a dental exam and cleaning every 6 months.  Yearly exams  See your health care team every year to talk about:  Any changes in your health.  Any medicines your care team has prescribed.  Preventive care, family planning, and ways to prevent chronic diseases.  Shots (vaccines)   HPV shots (up to age 26), if you've never had them before.  Hepatitis B shots (up to age 59), if you've never had them before.  COVID-19 shot: Get this shot when it's due.  Flu shot: Get a flu shot every year.  Tetanus shot: Get a tetanus shot every 10 years.  Pneumococcal, hepatitis A, and RSV shots: Ask your care team if you need these based on your risk.  Shingles shot (for age 50 and up)  General health tests  Diabetes screening:  Starting at age 35, Get screened for diabetes at least every 3 years.  If you are younger than age 35, ask your care team if you should be screened for diabetes.  Cholesterol test: At age 39, start having a cholesterol test every 5 years, or more often if advised.  Bone density scan (DEXA): At age 50, ask your care team if you should have this scan for osteoporosis (brittle bones).  Hepatitis C: Get tested at least once in your life.  STIs (sexually  transmitted infections)  Before age 24: Ask your care team if you should be screened for STIs.  After age 24: Get screened for STIs if you're at risk. You are at risk for STIs (including HIV) if:  You are sexually active with more than one person.  You don't use condoms every time.  You or a partner was diagnosed with a sexually transmitted infection.  If you are at risk for HIV, ask about PrEP medicine to prevent HIV.  Get tested for HIV at least once in your life, whether you are at risk for HIV or not.  Cancer screening tests  Cervical cancer screening: If you have a cervix, begin getting regular cervical cancer screening tests starting at age 21.  Breast cancer scan (mammogram): If you've ever had breasts, begin having regular mammograms starting at age 40. This is a scan to check for breast cancer.  Colon cancer screening: It is important to start screening for colon cancer at age 45.  Have a colonoscopy test every 10 years (or more often if you're at risk) Or, ask your provider about stool tests like a FIT test every year or Cologuard test every 3 years.  To learn more about your testing options, visit:   .  For help making a decision, visit:   https://bit.ly/tv23854.  Prostate cancer screening test: If you have a prostate, ask your care team if a prostate cancer screening test (PSA) at age 55 is right for you.  Lung cancer screening: If you are a current or former smoker ages 50 to 80, ask your care team if ongoing lung cancer screenings are right for you.  For informational purposes only. Not to replace the advice of your health care provider. Copyright   2023 [employer]. All rights reserved. Clinically reviewed by the [facility] Transitions Program. [employer] [id number] - REV 01/24.

## 2025-05-19 LAB
HPV HR 12 DNA CVX QL NAA+PROBE: NEGATIVE
HPV16 DNA CVX QL NAA+PROBE: NEGATIVE
HPV18 DNA CVX QL NAA+PROBE: NEGATIVE
HUMAN PAPILLOMA VIRUS FINAL DIAGNOSIS: NORMAL

## 2025-05-22 LAB
BKR AP ASSOCIATED HPV REPORT: NORMAL
BKR LAB AP GYN ADEQUACY: NORMAL
BKR LAB AP GYN INTERPRETATION: NORMAL
BKR LAB AP PREVIOUS ABNORMAL: NORMAL
PATH REPORT.COMMENTS IMP SPEC: NORMAL
PATH REPORT.COMMENTS IMP SPEC: NORMAL
PATH REPORT.RELEVANT HX SPEC: NORMAL

## 2025-08-17 ENCOUNTER — HOSPITAL ENCOUNTER (EMERGENCY)
Facility: HOSPITAL | Age: 40
Discharge: HOME | End: 2025-08-17
Payer: COMMERCIAL

## 2025-08-17 VITALS
RESPIRATION RATE: 18 BRPM | HEART RATE: 107 BPM | SYSTOLIC BLOOD PRESSURE: 124 MMHG | TEMPERATURE: 99 F | OXYGEN SATURATION: 97 % | DIASTOLIC BLOOD PRESSURE: 87 MMHG

## 2025-08-17 DIAGNOSIS — J06.9 VIRAL URI WITH COUGH: Primary | ICD-10-CM

## 2025-08-17 LAB — S PYO DNA THROAT QL NAA+PROBE: NOT DETECTED

## 2025-08-17 PROCEDURE — G0463 HOSPITAL OUTPT CLINIC VISIT: HCPCS

## 2025-08-17 PROCEDURE — 99213 OFFICE O/P EST LOW 20 MIN: CPT | Performed by: PHYSICIAN ASSISTANT

## 2025-08-17 PROCEDURE — 87651 STREP A DNA AMP PROBE: CPT | Performed by: FAMILY MEDICINE

## 2025-08-17 ASSESSMENT — COLUMBIA-SUICIDE SEVERITY RATING SCALE - C-SSRS
6. HAVE YOU EVER DONE ANYTHING, STARTED TO DO ANYTHING, OR PREPARED TO DO ANYTHING TO END YOUR LIFE?: NO
2. HAVE YOU ACTUALLY HAD ANY THOUGHTS OF KILLING YOURSELF IN THE PAST MONTH?: NO
1. IN THE PAST MONTH, HAVE YOU WISHED YOU WERE DEAD OR WISHED YOU COULD GO TO SLEEP AND NOT WAKE UP?: NO

## (undated) DEVICE — GLOVE 6.5 PROTEXIS PI CLSC PF BD CUF STRL LF 12IN 2D72PL65X

## (undated) DEVICE — DRSG KERLIX SUPER SPONGE 6X6.75" 2585

## (undated) DEVICE — GOWN SURG XL LVL 3 REINFORCED 9541

## (undated) DEVICE — CANISTER SUCTION MEDI-VAC GUARDIAN 2000ML 90D 65651-220

## (undated) DEVICE — DRAPE U SPLIT 74X120" 29440

## (undated) DEVICE — COVER LT HANDLE 2/PK 5160-2FG

## (undated) DEVICE — NDL COUNTER 20CT 31142493

## (undated) DEVICE — SU VICRYL 5-0 P-3 18" UND J493H

## (undated) DEVICE — LABEL STERILE PREPRINTED FOR OR FRRH01-2M

## (undated) DEVICE — DRSG TEGADERM 4X4 3/4" 1626

## (undated) DEVICE — ESU PENCIL W/SMOKE EVAC CVPLP2000

## (undated) DEVICE — CATH IV INTROCAN 18GA X 1.25IN TEFLON SFTY STR 4252560-02

## (undated) DEVICE — ESU GROUND PAD ADULT W/CORD E7507

## (undated) DEVICE — INSTRUMENT WIPE VISIWIPE 581047

## (undated) DEVICE — SOL NACL 0.9% IRRIG 1000ML BOTTLE 2F7124

## (undated) DEVICE — SOL WATER IRRIG 1000ML BOTTLE 2F7114

## (undated) DEVICE — NDL 27GA 1 1/2" 1188827112

## (undated) DEVICE — SYR 03ML LL W/O NDL 309657

## (undated) DEVICE — BLANKET BAIR HUGGER LOWER BODY 42568

## (undated) DEVICE — SYR 10ML FINGER CONTROL W/O NDL 309695

## (undated) DEVICE — PACK BASIN SET UP SUTCNBSBBA

## (undated) DEVICE — BLADE 15 RB BK SS STRL LF DISPLF DISP 371215

## (undated) DEVICE — BLADE KNIFE BEAVER MYRINGOTOMY 7120

## (undated) DEVICE — PACK SET UP CUSTOM SBA32SUMBF

## (undated) DEVICE — DRSG NON ADHERING 3 X 8 TELFA 1238

## (undated) DEVICE — CATH IV ADVANTIV 18GA X L1.25IN RADOPQ SFSHLD JJ3165

## (undated) DEVICE — TUBING SUCTION 20FT N620A

## (undated) DEVICE — SUTURE SILK 0 PSL 580H

## (undated) DEVICE — SPONGE COTTONOID 1/4X1/4" 80-1399

## (undated) DEVICE — DRAPE STERI TOWEL LG 1010

## (undated) DEVICE — EYE PREP BETADINE 5% SOLUTION 30ML 0065-0411-30

## (undated) DEVICE — Device

## (undated) DEVICE — COTTON BALL 2IN STRL C15000-300

## (undated) DEVICE — DRSG SPONGE STERILE 4X4 10/SOFT PK 397110

## (undated) RX ORDER — FENTANYL CITRATE 50 UG/ML
INJECTION, SOLUTION INTRAMUSCULAR; INTRAVENOUS
Status: DISPENSED
Start: 2024-09-24

## (undated) RX ORDER — PROPOFOL 10 MG/ML
INJECTION, EMULSION INTRAVENOUS
Status: DISPENSED
Start: 2024-09-24

## (undated) RX ORDER — PROPOFOL 10 MG/ML
INJECTION, EMULSION INTRAVENOUS
Status: DISPENSED
Start: 2023-12-27

## (undated) RX ORDER — DEXAMETHASONE SODIUM PHOSPHATE 10 MG/ML
INJECTION, SOLUTION INTRAMUSCULAR; INTRAVENOUS
Status: DISPENSED
Start: 2024-09-24

## (undated) RX ORDER — ONDANSETRON 2 MG/ML
INJECTION INTRAMUSCULAR; INTRAVENOUS
Status: DISPENSED
Start: 2024-09-24